# Patient Record
Sex: MALE | Race: WHITE | NOT HISPANIC OR LATINO | Employment: FULL TIME | URBAN - METROPOLITAN AREA
[De-identification: names, ages, dates, MRNs, and addresses within clinical notes are randomized per-mention and may not be internally consistent; named-entity substitution may affect disease eponyms.]

---

## 2023-08-04 ENCOUNTER — HOSPITAL ENCOUNTER (OUTPATIENT)
Dept: DATA CONVERSION | Facility: HOSPITAL | Age: 53
Discharge: HOME | End: 2023-08-04

## 2023-08-04 DIAGNOSIS — S46.212A STRAIN OF MUSCLE, FASCIA AND TENDON OF OTHER PARTS OF BICEPS, LEFT ARM, INITIAL ENCOUNTER: ICD-10-CM

## 2023-08-04 LAB
ALBUMIN SERPL-MCNC: 4.8 GM/DL (ref 3.5–5)
ALBUMIN/GLOB SERPL: 2.1 RATIO (ref 1.5–3)
ALP BLD-CCNC: 31 U/L (ref 35–125)
ALT SERPL-CCNC: 38 U/L (ref 5–40)
ANION GAP SERPL CALCULATED.3IONS-SCNC: 9 MMOL/L (ref 0–19)
ANTICOAGULANT: NORMAL
APTT PPP: 25 SEC (ref 22–32.5)
AST SERPL-CCNC: 35 U/L (ref 5–40)
BASOPHILS # BLD AUTO: 0.03 K/UL (ref 0–0.22)
BASOPHILS NFR BLD AUTO: 0.7 % (ref 0–1)
BILIRUB SERPL-MCNC: 0.5 MG/DL (ref 0.1–1.2)
BUN SERPL-MCNC: 18 MG/DL (ref 8–25)
BUN/CREAT SERPL: 16.4 RATIO (ref 8–21)
CALCIUM SERPL-MCNC: 9.7 MG/DL (ref 8.5–10.4)
CHLORIDE SERPL-SCNC: 105 MMOL/L (ref 97–107)
CO2 SERPL-SCNC: 26 MMOL/L (ref 24–31)
CREAT SERPL-MCNC: 1.1 MG/DL (ref 0.4–1.6)
DEPRECATED RDW RBC AUTO: 40 FL (ref 37–54)
DIFFERENTIAL METHOD BLD: ABNORMAL
EOSINOPHIL # BLD AUTO: 0.16 K/UL (ref 0–0.45)
EOSINOPHIL NFR BLD: 3.8 % (ref 0–3)
ERYTHROCYTE [DISTWIDTH] IN BLOOD BY AUTOMATED COUNT: 12.4 % (ref 11.7–15)
GFR SERPL CREATININE-BSD FRML MDRD: 81 ML/MIN/1.73 M2
GLOBULIN SER-MCNC: 2.3 G/DL (ref 1.9–3.7)
GLUCOSE SERPL-MCNC: 99 MG/DL (ref 65–99)
HCT VFR BLD AUTO: 45.6 % (ref 41–50)
HGB BLD-MCNC: 15.1 GM/DL (ref 13.5–16.5)
IMM GRANULOCYTES # BLD AUTO: 0.01 K/UL (ref 0–0.1)
INR PPP: 1 (ref 0.86–1.16)
LYMPHOCYTES # BLD AUTO: 1.28 K/UL (ref 1.2–3.2)
LYMPHOCYTES NFR BLD MANUAL: 30.5 % (ref 20–40)
MCH RBC QN AUTO: 28.7 PG (ref 26–34)
MCHC RBC AUTO-ENTMCNC: 33.1 % (ref 31–37)
MCV RBC AUTO: 86.5 FL (ref 80–100)
MONOCYTES # BLD AUTO: 0.38 K/UL (ref 0–0.8)
MONOCYTES NFR BLD MANUAL: 9 % (ref 0–8)
NEUTROPHILS # BLD AUTO: 2.34 K/UL
NEUTROPHILS # BLD AUTO: 2.34 K/UL (ref 1.8–7.7)
NEUTROPHILS.IMMATURE NFR BLD: 0.2 % (ref 0–1)
NEUTS SEG NFR BLD: 55.8 % (ref 50–70)
PLATELET # BLD AUTO: 212 K/UL (ref 150–450)
PMV BLD AUTO: 10.2 CU (ref 7–12.6)
POTASSIUM SERPL-SCNC: 4.7 MMOL/L (ref 3.4–5.1)
PROT SERPL-MCNC: 7.1 G/DL (ref 5.9–7.9)
PROTHROMBIN TIME: 10.2 SEC (ref 9.3–12.7)
RBC # BLD AUTO: 5.27 M/UL (ref 4.5–5.5)
SODIUM SERPL-SCNC: 140 MMOL/L (ref 133–145)
WBC # BLD AUTO: 4.2 K/UL (ref 4.5–11)

## 2023-08-08 ENCOUNTER — HOSPITAL ENCOUNTER (OUTPATIENT)
Dept: DATA CONVERSION | Facility: HOSPITAL | Age: 53
Discharge: HOME | End: 2023-08-08

## 2023-08-08 DIAGNOSIS — E78.5 HYPERLIPIDEMIA, UNSPECIFIED: ICD-10-CM

## 2023-08-08 DIAGNOSIS — S46.211A STRAIN OF MUSCLE, FASCIA AND TENDON OF OTHER PARTS OF BICEPS, RIGHT ARM, INITIAL ENCOUNTER: ICD-10-CM

## 2023-08-08 DIAGNOSIS — Z88.0 ALLERGY STATUS TO PENICILLIN: ICD-10-CM

## 2023-08-08 DIAGNOSIS — S46.212A STRAIN OF MUSCLE, FASCIA AND TENDON OF OTHER PARTS OF BICEPS, LEFT ARM, INITIAL ENCOUNTER: ICD-10-CM

## 2023-08-08 DIAGNOSIS — E66.9 OBESITY, UNSPECIFIED: ICD-10-CM

## 2023-08-08 DIAGNOSIS — I10 ESSENTIAL (PRIMARY) HYPERTENSION: ICD-10-CM

## 2023-08-08 DIAGNOSIS — Z88.1 ALLERGY STATUS TO OTHER ANTIBIOTIC AGENTS: ICD-10-CM

## 2023-10-04 PROBLEM — M25.622 DECREASED ROM OF LEFT ELBOW: Status: ACTIVE | Noted: 2023-10-04

## 2023-10-04 PROBLEM — S46.211D RUPTURE OF DISTAL BICEPS TENDON, RIGHT, SUBSEQUENT ENCOUNTER: Status: ACTIVE | Noted: 2023-10-04

## 2023-10-04 PROBLEM — S46.212A RUPTURE OF DISTAL BICEPS TENDON, LEFT, INITIAL ENCOUNTER: Status: ACTIVE | Noted: 2023-10-04

## 2023-10-04 PROBLEM — S46.219A BICEPS STRAIN: Status: ACTIVE | Noted: 2023-10-04

## 2023-10-06 ENCOUNTER — TREATMENT (OUTPATIENT)
Dept: PHYSICAL THERAPY | Facility: CLINIC | Age: 53
End: 2023-10-06
Payer: COMMERCIAL

## 2023-10-06 DIAGNOSIS — M25.622 STIFFNESS OF LEFT ELBOW, NOT ELSEWHERE CLASSIFIED: ICD-10-CM

## 2023-10-06 DIAGNOSIS — M25.622 DECREASED ROM OF LEFT ELBOW: Primary | ICD-10-CM

## 2023-10-06 PROCEDURE — 97110 THERAPEUTIC EXERCISES: CPT | Mod: GP,CQ

## 2023-10-06 ASSESSMENT — PAIN SCALES - GENERAL: PAINLEVEL_OUTOF10: 0 - NO PAIN

## 2023-10-06 ASSESSMENT — PAIN - FUNCTIONAL ASSESSMENT: PAIN_FUNCTIONAL_ASSESSMENT: 0-10

## 2023-10-06 NOTE — PROGRESS NOTES
Physical Therapy    Physical Therapy Treatment      Patient Name: Donnie Trotter  MRN: [id number]  Today's Date: 10/6/2023         Assessment:   Reviewed protocol with the pt. Encouraged him to not overdo to prevent re injuring the bicep. Tolerated progressed exercises well. No increased pain. Shoulder AROM flexion, scaption and abduction were challenging. Reviewed gentle elbow PROM for home.    Plan:   Assess tolerance of progressed exercises.    Current Problem:   1. Decreased ROM of left elbow        2. Stiffness of left elbow, not elsewhere classified  PT eval and treat          Subjective    General  General Comment: Doing well. No pain. Pt. has been doing normal activity but not lifting anything heavy. No longer wearing brace.    Precautions:   Post op distal bicep tendon repair   protocol     Pain:  Pain Assessment  Pain Assessment: 0-10  Pain Score: 0 - No pain  Pain Type: Acute pain  Pain Location: Elbow  Pain Orientation: Left     Objective    L elbow AROM (degrees)  Flexion = 134  Extension = -5      Treatments:   EXERCISE Date 10/6/23 Date Date Date    REPS REPS REPS REPS          UE Bike              Pulleys      Flexion       Pulleys      Scaption/Abduction       Pulleys      IR              Tband       Pull down Black 30X      Tband       Mid rows Black 30X      Tband       IR Purple 2X15      Tband       ER Purple 2X15      Tband       ADD Purple 30X             Wand              [facility]              UE RANGER              Bicep curl (3) 2# 2X15      Shoulder AROM flexion 2X15  3#                                 scaption 2X15  3#                                 abduction 2X15  3#                    Manual PROM  5 minutes                                   Goals:  by week >12   Range Of Motion/Joint Mobility: increase L shoulder/elbow AROM to WNL to ease capability to perform ADLs, goal partially met   Strength: increase L UE strength to 5/5 to prepare for eventual RTW, goal partially met

## 2023-10-11 ENCOUNTER — TREATMENT (OUTPATIENT)
Dept: PHYSICAL THERAPY | Facility: CLINIC | Age: 53
End: 2023-10-11
Payer: COMMERCIAL

## 2023-10-11 DIAGNOSIS — M25.622 STIFFNESS OF LEFT ELBOW, NOT ELSEWHERE CLASSIFIED: ICD-10-CM

## 2023-10-11 PROCEDURE — 97110 THERAPEUTIC EXERCISES: CPT | Mod: GP,CQ

## 2023-10-11 ASSESSMENT — PAIN SCALES - GENERAL: PAINLEVEL_OUTOF10: 0 - NO PAIN

## 2023-10-11 ASSESSMENT — PAIN - FUNCTIONAL ASSESSMENT: PAIN_FUNCTIONAL_ASSESSMENT: 0-10

## 2023-10-11 NOTE — PROGRESS NOTES
"Physical Therapy    Physical Therapy Treatment    Patient Name: Donnie Trotter  MRN: [id number]  Today's Date: 10/11/2023         Assessment:   Min verbal/tactile cueing needed to perform shoulder T-band IR/ER with the correct technique and form. Left UE fatigue at end of treatment but no increased soreness.    Plan:  Progress exercises per protocol.   Issue progressed HEP.    Current Problem  1. Stiffness of left elbow, not elsewhere classified  PT eval and treat          Subjective   General Comment: Left shoulder was a little sore after his last PT visit. No elbow soreness. Supination is \"tight\".    Precautions   Post op distal bicep tendon repair   protocol     Pain  Pain Assessment: 0-10  Pain Score: 0 - No pain  Pain Location: Elbow  Pain Orientation: Left    Objective    L elbow AROM (degrees)  Flexion = 134  Extension = -5    Treatments:  EXERCISE Date 10/6/23 Date 10/11/23 Date Date    REPS REPS REPS REPS          UE Bike              Pulleys      Flexion 3 minutes 3 minutes     Pulleys      Scaption/Abduction       Pulleys      IR              Tband       Pull down Black 30X Black 30x     Tband       Mid rows Black 30X Black 30x     Tband       IR Purple 2X15 Purple 2x15     Tband       ER Purple 2X15 Purple 2x15     Tband       ADD Purple 30X Purple 2x15            Wand              I T Y (paul)              UE RANGER              Bicep curl (3) 2# 2X15 2# 2x15     Shoulder AROM flexion 2X15  3# 2x15                                scaption 2X15  3# 2x15                                abduction 2X15  3# 2x15                   Manual PROM  5 minutes review                               Goals:   by week >12   Range Of Motion/Joint Mobility: increase L shoulder/elbow AROM to WNL to ease capability to perform ADLs, goal partially met   Strength: increase L UE strength to 5/5 to prepare for eventual RTW, goal partially met     "

## 2023-10-16 ENCOUNTER — TREATMENT (OUTPATIENT)
Dept: PHYSICAL THERAPY | Facility: CLINIC | Age: 53
End: 2023-10-16
Payer: COMMERCIAL

## 2023-10-16 DIAGNOSIS — M25.622 STIFFNESS OF LEFT ELBOW, NOT ELSEWHERE CLASSIFIED: ICD-10-CM

## 2023-10-16 PROCEDURE — 97110 THERAPEUTIC EXERCISES: CPT | Mod: GP,CQ

## 2023-10-16 ASSESSMENT — PAIN SCALES - GENERAL: PAINLEVEL_OUTOF10: 0 - NO PAIN

## 2023-10-16 ASSESSMENT — PAIN - FUNCTIONAL ASSESSMENT: PAIN_FUNCTIONAL_ASSESSMENT: 0-10

## 2023-10-16 NOTE — PROGRESS NOTES
"Physical Therapy    Physical Therapy Treatment    Patient Name: Donnie Trotter  MRN: [id number]  Today's Date: 10/16/2023  Time Calculation  Start Time: 0845  Stop Time: 0930  Time Calculation (min): 45 min      Assessment:   Patient performed added exercises without complaint of increased left elbow or upper extremity symptoms. Patient indicates and demonstrates compliance with home exercise program.     Plan:   Continue with working to improve range of motion and strength of left elbow and upper extremity progressing as per protocol to restore functional use of left upper extremity with daily activities.     Patient RTD 11/06/2023.  Issue progressed HEP.    Current Problem  1. Stiffness of left elbow, not elsewhere classified  PT eval and treat          Subjective    Patient reports mild soreness after last treatment however felt better by the following day.     Precautions  Precautions  Precautions Comment: Per protocolPost op distal bicep tendon repair   protocol     Pain  Pain Assessment: 0-10  Pain Score: 0 - No pain  Pain Location: Elbow  Pain Orientation: Left    Objective   Added exercises. See exercise log for specifics.   Progressed and issued HEP.   Left elbow AROM  Flexion = 135 degrees      Treatments:  EXERCISE Date 10/6/23 Date 10/11/23 Date   10/16/2023 Date    REPS REPS REPS REPS          UE Bike              Pulleys      Flexion 3 minutes 3 minutes 3 minutes    Pulleys      Scaption/Abduction       Pulleys      IR              Tband       Pull down Black 30X Black 30x Silver x 20    Tband       Mid rows Black 30X Black 30x Silver x 20    Tband       IR Purple 2X15 Purple 2x15 Silver x 20    Tband       ER Purple 2X15 Purple 2x15 Silver x 20    Tband       ADD Purple 30X Purple 2x15     BTE disc open/close jar   T-15 60\" each    BTE gripper   T-111 90\"    BTE screwdriver   T -9 60\" each    BTE pro/sup   T-6 60\" each    BTE ROM   T-75 100\" each    BTE H. Push/Pull   T-132 100\"    BTE Push/Pull   " "T-132 100\"    Bicep curl (3) 2# 2X15 2# 2x15     Shoulder AROM flexion 2X15  3# 2x15                                scaption 2X15  3# 2x15                                abduction 2X15  3# 2x15                   Manual PROM  5 minutes review                              Education  Education Comment: Access Code: [email or web address]  URL: [email or web address]  Date: 10/16/2023  Prepared by: Elías Gruber    Exercises  - Pulldowns  - 1-2 x daily - 7 x weekly - 1-3 sets - 20-30 reps  - Standing Shoulder Row with Anchored Resistance  - 1-2 x daily - 7 x weekly - 1-3 sets - 20-30 reps  - Shoulder External Rotation  - 1-2 x daily - 7 x weekly - 1-3 sets - 20-30 reps  - Shoulder Internal Rotation  - 1-2 x daily - 7 x weekly - 1-3 sets - 20-30 reps  - Forearm Pronation and Supination with Hammer  - 1-2 x daily - 7 x weekly - 1-3 sets - 20-30 reps  - Standing Wrist Radial Deviation with Hammer  - 1-2 x daily - 7 x weekly - 1-3 sets - 20-30 reps  - Standing Wrist Ulnar Deviation with Hammer  - 1-2 x daily - 7 x weekly - 1-3 sets - 20-30 reps    Goals:   by week >12   Range Of Motion/Joint Mobility: increase L shoulder/elbow AROM to WNL to ease capability to perform ADLs,     10/16/2023 progressing    Strength: increase L UE strength to 5/5 to prepare for eventual RTW, goal partially met   "

## 2023-10-23 ENCOUNTER — TREATMENT (OUTPATIENT)
Dept: PHYSICAL THERAPY | Facility: CLINIC | Age: 53
End: 2023-10-23
Payer: COMMERCIAL

## 2023-10-23 DIAGNOSIS — M25.622 STIFFNESS OF LEFT ELBOW, NOT ELSEWHERE CLASSIFIED: ICD-10-CM

## 2023-10-23 PROCEDURE — 97110 THERAPEUTIC EXERCISES: CPT | Mod: GP

## 2023-10-23 ASSESSMENT — PAIN SCALES - GENERAL: PAINLEVEL_OUTOF10: 0 - NO PAIN

## 2023-10-23 ASSESSMENT — PAIN - FUNCTIONAL ASSESSMENT: PAIN_FUNCTIONAL_ASSESSMENT: 0-10

## 2023-10-23 NOTE — PROGRESS NOTES
"Physical Therapy    Physical Therapy Treatment    Patient Name: Donnie Trotter  MRN: [id number]  Today's Date: 10/23/2023  Time Calculation  Start Time: 0815  Stop Time: 0900  Time Calculation (min): 45 min    Assessment:   The patient reports experiencing L UE fatigue following Rx but no pain.      Plan:   Continue with working to improve range of motion and strength of left elbow and upper extremity progressing as per protocol to restore functional use of left upper extremity with daily activities.     Patient RTD 11/06/2023.  Issue progressed HEP.    Current Problem  1. Stiffness of left elbow, not elsewhere classified  PT eval and treat          Subjective    The patient reports he did not experience L UE pain or soreness following last treatment session.      Precautions  Precautions  Precautions Comment: per protocolPost op distal bicep tendon repair   protocol     Pain  Pain Assessment: 0-10  Pain Score: 0 - No pain    Objective   L elbow AROM = WNL      Treatments:  EXERCISE Date 10/6/23 Date 10/11/23 Date   10/16/2023 Date 10/23/23    REPS REPS REPS REPS          UE Bike              Pulleys      Flexion 3 minutes 3 minutes 3 minutes 3'   Pulleys      Scaption/Abduction       Pulleys      IR       T Band Skis    Black x20   Tband       Pull down Black 30X Black 30x Silver x 20 \"   Tband       Mid rows Black 30X Black 30x Silver x 20 \"   Tband       IR Purple 2X15 Purple 2x15 Silver x 20 \"   Tband       ER Purple 2X15 Purple 2x15 Silver x 20 \"   Tband       ADD Purple 30X Purple 2x15     BTE disc open/close jar   T-15 60\" each T - 15 60\" ea   BTE gripper   T-111 90\" T - 111 90\"   BTE screwdriver   T -9 60\" each T-9 60\" ea   BTE pro/sup   T-6 60\" each T-6 60\" ea   BTE ROM   T-75 100\" each T-75 100\" ea   BTE H. Push/Pull   T-132 100\" T- 132 100\"   BTE Push/Pull   T-132 100\" T-132 100\"   Bicep curl (3) 2# 2X15 2# 2x15  3# 2x15   Shoulder AROM flexion 2X15  3# 2x15  3# 2x15                              scaption " 2X15  3# 2x15  3# 2x15                              abduction 2X15  3# 2x15  3# 2x15                 Manual PROM  5 minutes review                                   Goals:   by week >12   Range Of Motion/Joint Mobility: increase L shoulder/elbow AROM to WNL to ease capability to perform ADLs,    - progressing    Strength: increase L UE strength to 5/5 to prepare for eventual RTW, goal partially met

## 2023-10-30 ENCOUNTER — APPOINTMENT (OUTPATIENT)
Dept: PHYSICAL THERAPY | Facility: CLINIC | Age: 53
End: 2023-10-30
Payer: COMMERCIAL

## 2023-11-06 ENCOUNTER — OFFICE VISIT (OUTPATIENT)
Dept: ORTHOPEDIC SURGERY | Facility: CLINIC | Age: 53
End: 2023-11-06
Payer: COMMERCIAL

## 2023-11-06 VITALS — HEIGHT: 71 IN | WEIGHT: 240 LBS | BODY MASS INDEX: 33.6 KG/M2

## 2023-11-06 DIAGNOSIS — S46.212A RUPTURE OF DISTAL BICEPS TENDON, LEFT, INITIAL ENCOUNTER: Primary | ICD-10-CM

## 2023-11-06 PROCEDURE — 99024 POSTOP FOLLOW-UP VISIT: CPT | Performed by: STUDENT IN AN ORGANIZED HEALTH CARE EDUCATION/TRAINING PROGRAM

## 2023-11-06 RX ORDER — ROSUVASTATIN CALCIUM 40 MG/1
40 TABLET, COATED ORAL EVERY 24 HOURS
COMMUNITY
Start: 2022-08-16

## 2023-11-06 RX ORDER — FENOFIBRATE 145 MG/1
145 TABLET ORAL EVERY 24 HOURS
COMMUNITY

## 2023-11-06 RX ORDER — LISINOPRIL 10 MG/1
10 TABLET ORAL EVERY 24 HOURS
COMMUNITY

## 2023-11-06 RX ORDER — OMEGA-3-ACID ETHYL ESTERS 1 G/1
1 CAPSULE, LIQUID FILLED ORAL EVERY 24 HOURS
COMMUNITY

## 2023-11-06 ASSESSMENT — PAIN - FUNCTIONAL ASSESSMENT: PAIN_FUNCTIONAL_ASSESSMENT: NO/DENIES PAIN

## 2023-11-06 NOTE — PROGRESS NOTES
13 weeks status post distal biceps reconstruction with allograft done 8/8/23. He is doing well and has no concerns at this time. Progressing well through PT. Patient mentions occasional numbness and tingling into his thumb.

## 2023-11-07 NOTE — PROGRESS NOTES
PRIMARY CARE PHYSICIAN: Jimenez Carlin MD    ORTHOPAEDIC POSTOPERATIVE VISIT    ASSESSMENT & PLAN    Impression: 53 y.o. male 3 months postop s/p left distal biceps reconstruction with allograft.    Plan:   Overall he is doing well.  He will progressively return to his activities as he tolerates.  He will continue to work on his strength.  He will return to see me as needed.  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  He will likely require 2 additional months of strengthening prior to being ready to return to work.    I reviewed the intraoperative findings with the patient and answered all their questions. I reviewed their postoperative timeline and plan with them. They understand the postoperative precautions and the treatment plan going forward.     Follow-Up: Patient will follow-up as needed    At the end of the visit, all questions were answered in full. The patient is in agreement with the plan and recommendations. They will call the office with any questions/concerns.      Note dictated with [employer] software. Completed without full typed error editing and sent to avoid delay.      SUBJECTIVE  CHIEF COMPLAINT:   Chief Complaint   Patient presents with    Left Elbow - Follow-up        HPI: Donnie Trotter is a 53 y.o. patient. Donnie Trotter is now postop status post left distal biceps reconstruction with allograft.  Overall he is doing well.  He notes minimal pain.  His strength is improving.    REVIEW OF SYSTEMS  Constitutional: See HPI for pain assessment, No significant weight loss, recent trauma  Cardiovascular: No chest pain, shortness of breath  Respiratory: No difficulty breathing, cough  Gastrointestinal: No nausea, vomiting, diarrhea, constipation  Musculoskeletal: Noted in HPI, positive for pain, restricted motion, stiffness  Integumentary: No rashes,  "easy bruising, redness   Neurological: no numbness or tingling in extremities, no gait disturbances   Psychiatric: No mood changes, memory changes, social issues  Heme/Lymph: no excessive swelling, easy bruising, excessive bleeding  ENT: No hearing changes  Eyes: No vision changes    CURRENT MEDICATIONS:   Current Outpatient Medications   Medication Sig Dispense Refill    lisinopril 10 mg tablet Take 1 tablet (10 mg) by mouth once every 24 hours.      Lovaza 1 gram capsule Take 1 capsule (1 g) by mouth once every 24 hours.      rosuvastatin (Crestor) 40 mg tablet Take 1 tablet (40 mg) by mouth once every 24 hours.      Tricor 145 mg tablet Take 1 tablet (145 mg) by mouth once every 24 hours.       No current facility-administered medications for this visit.        OBJECTIVE    PHYSICAL EXAM      11/6/2023     8:05 AM   Vitals   Height (in) 1.803 m (5' 11\")   Weight (lb) 240   BMI 33.47 kg/m2   BSA (m2) 2.34 m2   Visit Report Report      Body mass index is 33.47 kg/m².    General: Well-appearing, no acute distress    Skin intact bilateral upper and lower extremities  No erythema  No warmth    Detailed examination of the left elbow demonstrates:  Surgical incision(s) well-healed  No erythema or warmth  No ecchymosis or soft tissue swelling  Biceps contour normal  Elbow range of motion: 0-0-135  Upper extremity motor grossly intact  C5-T1 sensation intact bilaterally  2+ radial pulses bilaterally  Warm and well-perfused, brisk capillary refill    IMAGING:   No new imaging      Santino Roberto MD  Attending Surgeon    Sports Medicine Orthopaedic Surgery  [facility] [place]  [facility] [employer]    "

## 2023-11-09 ENCOUNTER — TREATMENT (OUTPATIENT)
Dept: PHYSICAL THERAPY | Facility: CLINIC | Age: 53
End: 2023-11-09
Payer: COMMERCIAL

## 2023-11-09 DIAGNOSIS — M25.622 STIFFNESS OF LEFT ELBOW, NOT ELSEWHERE CLASSIFIED: Primary | ICD-10-CM

## 2023-11-09 PROCEDURE — 97110 THERAPEUTIC EXERCISES: CPT | Mod: GP

## 2023-11-09 ASSESSMENT — PAIN - FUNCTIONAL ASSESSMENT: PAIN_FUNCTIONAL_ASSESSMENT: 0-10

## 2023-11-09 ASSESSMENT — PAIN SCALES - GENERAL: PAINLEVEL_OUTOF10: 0 - NO PAIN

## 2023-11-09 NOTE — PROGRESS NOTES
"Physical Therapy    Physical Therapy Treatment (reassessment)    Patient Name: Donnie Trotter  MRN: [id number]  Today's Date: 11/9/2023  Time Calculation  Start Time: 1215  Stop Time: 1255  Time Calculation (min): 40 min    Assessment:   The patient's L UE strength continues to improve.      Plan:   Continue with working to improve strength of left elbow and upper extremity preparing for eventual RTW.      Patient RTD 11/06/2023.  Issue progressed HEP.    Current Problem  1. Stiffness of left elbow, not elsewhere classified  PT eval and treat    Follow Up In Physical Therapy          Subjective    The patient reports Dr. Roberto cleared him to return to the gym using light weights.      Precautions  Precautions  Precautions Comment: per protocol   Post op distal bicep tendon repair protocol     Pain  Pain Assessment: 0-10  Pain Score: 0 - No pain    Objective   L elbow AROM = WNL    L UE strength  Biceps = 5/5 (pain)  Triceps = 5/5  Supination = 5/5 (pain)  Pronation = 5/5    Quickdash - 31.82  Treatments:  EXERCISE Date 10/6/23 Date 10/11/23 Date   10/16/2023 Date 10/23/23    REPS REPS REPS REPS          UE Bike              Pulleys      Flexion 3 minutes 3 minutes 3 minutes 3'   Pulleys      Scaption/Abduction       Pulleys      IR       T Band Skis    Black x20   Tband       Pull down Black 30X Black 30x Silver x 20 \"   Tband       Mid rows Black 30X Black 30x Silver x 20 \"   Tband       IR Purple 2X15 Purple 2x15 Silver x 20 \"   Tband       ER Purple 2X15 Purple 2x15 Silver x 20 \"   Tband       ADD Purple 30X Purple 2x15     BTE disc open/close jar   T-15 60\" each T - 15 60\" ea   BTE gripper   T-111 90\" T - 111 90\"   BTE screwdriver   T -9 60\" each T-9 60\" ea   BTE pro/sup   T-6 60\" each T-6 60\" ea   BTE ROM   T-75 100\" each T-75 100\" ea   BTE H. Push/Pull   T-132 100\" T- 132 100\"   BTE Push/Pull   T-132 100\" T-132 100\"   Bicep curl (3) 2# 2X15 2# 2x15  3# 2x15   Shoulder AROM flexion 2X15  3# 2x15  3# 2x15        " "                      scaption 2X15  3# 2x15  3# 2x15                              abduction 2X15  3# 2x15  3# 2x15                 Manual PROM  5 minutes review                            EXERCISE Date 11/9/23 Date Date Date    REPS REPS REPS REPS          UE Bike              Pulleys      Flexion       Pulleys      Scaption/Abduction       Pulleys      IR              Tband       Pull down       Tband       Mid rows       Tband       IR       Tband       ER       Flex/Scap/Abd 5# 2x15             Wand              I T Y ([facility])              UE RANGER              PRIMUS ROM  T-91 100\"ea      V P/P T-168 100\"      H P/P T-168 100\"      Pro/Sup T-6 60\" ea      Wrist Flex/Ext T-9 60\" ea      Gripper T-111 90\"       Open/close jar T-15 60\"ea                    Reassess 10'            Goals:   by week >12   Range Of Motion/Joint Mobility: increase L shoulder/elbow AROM to WNL to ease capability to perform ADLs,    - goal met    Strength: The patient will demonstrate pain free 5/5 L UE strength to prepare for eventual RTW, goal partially met   "

## 2023-11-10 ENCOUNTER — DOCUMENTATION (OUTPATIENT)
Dept: ORTHOPEDIC SURGERY | Facility: CLINIC | Age: 53
End: 2023-11-10
Payer: COMMERCIAL

## 2023-11-10 NOTE — LETTER
November 10, 2023     Patient: Donnie Trotter   YOB: 1970   Date of Visit: 11/10/2023       To Whom It May Concern:    Donnie Trotter was seen in my clinic on 11/10/2023, it's my medical advice that at this point he is not yet ready to return to work as he requires additional strengthening in order to return to his strenuous job which requires heavy lifting.  He will likely require 2 additional months of strengthening prior to being ready to return to work.     If you have any questions or concerns, please don't hesitate to call [phone].         Sincerely,         Dr. Santino Roberto M.D.

## 2023-11-16 ENCOUNTER — TREATMENT (OUTPATIENT)
Dept: PHYSICAL THERAPY | Facility: CLINIC | Age: 53
End: 2023-11-16
Payer: COMMERCIAL

## 2023-11-16 DIAGNOSIS — M25.622 STIFFNESS OF LEFT ELBOW, NOT ELSEWHERE CLASSIFIED: ICD-10-CM

## 2023-11-16 PROCEDURE — 97110 THERAPEUTIC EXERCISES: CPT | Mod: GP,CQ

## 2023-11-16 ASSESSMENT — PAIN - FUNCTIONAL ASSESSMENT: PAIN_FUNCTIONAL_ASSESSMENT: 0-10

## 2023-11-16 ASSESSMENT — PAIN SCALES - GENERAL: PAINLEVEL_OUTOF10: 0 - NO PAIN

## 2023-11-16 NOTE — PROGRESS NOTES
"Physical Therapy    Physical Therapy Treatment    Patient Name: Donnie Trotter  MRN: [id number]  Today's Date: 11/16/2023   Time Calculation  Start Time: 800  Stop Time: 845  Time Calculation (min): 40 min      Assessment:   Left UE strength is improving nicely. No increased pain at the end of his treatment but he was fatigued.    Plan:    Continue with working to improve strength of left elbow and upper extremity preparing for eventual RTW.        Current Problem  1. Stiffness of left elbow, not elsewhere classified  Follow Up In Physical Therapy        Subjective  General   General  General Comment: Pt. was released to go back to the gym. He did a chest and triceps workout yesterday. He is sore today. Pt. is doing light weight and high reps. He will follow up with Dr. Roberto on 1/8/23. Pt. will discuss possible return to work.    Precautions  Precautions  Precautions Comment: per protocol   Post op distal bicep tendon repair protocol     Pain  Pain Assessment  Pain Assessment: 0-10  Pain Score: 0 - No pain Continue with working to improve strength of left elbow and upper extremity preparing for eventual RTW.      Objective   L UE strength  Biceps = 5/5 (pain)  Triceps = 5/5  Supination = 5/5 (pain)  Pronation = 5/5    Increased BTE exercise resistance    Treatments:  EXERCISE Date 11/9/23 Date 11/16/23 Date Date    REPS REPS REPS REPS          UE Bike              Pulleys      Flexion  3 minutes     Pulleys      Scaption/Abduction       Pulleys      IR              Tband       Pull down       Tband       Mid rows  Black 30x     Tband       IR       Tband       ER       Flex/Scap/Abd 5# 2x15 5# 2x15            Wand              I T Y ([facility])              UE RANGER              PRIMUS ROM  T-91 100\"ea T101 100\" each     V P/P T-168 100\" T174 100\" each     H P/P T-168 100\" T174 100\" each     Pro/Sup T-6 60\" ea T 12 60\" each     Wrist Flex/Ext T-9 60\" ea NT     Gripper T-111 90\"  T120 90\"     Open/close jar T-15 " "60\"ea T 20 60\" ea                   Reassess 10'         Goals:  by week >12   Range Of Motion/Joint Mobility: increase L shoulder/elbow AROM to WNL to ease capability to perform ADLs,    - goal met    Strength: The patient will demonstrate pain free 5/5 L UE strength to prepare for eventual RTW, goal partially met   "

## 2023-11-21 ENCOUNTER — TREATMENT (OUTPATIENT)
Dept: PHYSICAL THERAPY | Facility: CLINIC | Age: 53
End: 2023-11-21
Payer: COMMERCIAL

## 2023-11-21 DIAGNOSIS — M25.622 STIFFNESS OF LEFT ELBOW, NOT ELSEWHERE CLASSIFIED: ICD-10-CM

## 2023-11-21 PROCEDURE — 97110 THERAPEUTIC EXERCISES: CPT | Mod: GP,CQ

## 2023-11-21 ASSESSMENT — PAIN SCALES - GENERAL: PAINLEVEL_OUTOF10: 0 - NO PAIN

## 2023-11-21 ASSESSMENT — PAIN - FUNCTIONAL ASSESSMENT: PAIN_FUNCTIONAL_ASSESSMENT: 0-10

## 2023-11-21 NOTE — PROGRESS NOTES
"Physical Therapy    Physical Therapy Treatment    Patient Name: Donnie Trotter  MRN: [id number]  Today's Date: 11/21/2023   Time Calculation  Start Time: 800  Stop Time: 845  Time Calculation (min): 40 min    Visit 8    Assessment:   Left UE strength is improving nicely. No increased pain at the end of his treatment but he was fatigued. Temporary discomfort in forearm when doing BTE exercises.    Plan:    Continue with working to improve strength of left elbow and upper extremity preparing for eventual RTW.        Current Problem  1. Stiffness of left elbow, not elsewhere classified  Follow Up In Physical Therapy        Subjective  General    Returned to the gym last week using 1/4 pre-injury weight. He is starting with 3 gym sessions a week. He did not have an increase in pain after the gym sessions, just soreness. Biceps exercises at gym still feel \"weird\" - as if biceps is cramping. Bicep \"balled up\" during gym workout. Once he straightened out his arm the tightness decreased.     Precautions  Precautions  Precautions Comment: per protocol   Post op distal bicep tendon repair protocol     Pain  Pain Assessment  Pain Assessment: 0-10  Pain Score: 0 - No pain  Pain Location: Shoulder  Pain Orientation: Left Continue with working to improve strength of left elbow and upper extremity preparing for eventual RTW.      Objective   L UE strength  Biceps = 5/5 (pain)  Triceps = 5/5  Supination = 5/5 (pain)  Pronation = 5/5    Added Tband Pull down  Increased resistance for BTE ROM, Gripper, Open/Close Jar, and resumed Wrist Flexion/Extension.    Treatments:  EXERCISE Date 11/9/23 Date 11/16/23 Date 11/21/23 Date    REPS REPS REPS REPS          UE Bike              Pulleys      Flexion  3 minutes 3 minutes    Pulleys      Scaption/Abduction       Pulleys      IR              Tband       Pull down   Black 30x    Tband       Mid rows  Black 30x Black 30x    Tband       IR       Tband       ER       Flex/Scap/Abd 5# 2x15 5# " "2x15 5# 2x15           Wand              I T Y ([facility])              UE RANGER              PRIMUS ROM  T-91 100\"ea T101 100\" each T-111 100\" each    V P/P T-168 100\" T174 100\" each T-174 100\"    H P/P T-168 100\" T174 100\" each T-174 100\"    Pro/Sup T-6 60\" ea T 12 60\" each T-15 60\" each    Wrist Flex/Ext T-9 60\" ea NT T-9 60\" each    Gripper T-111 90\"  T120 90\" T-129 90\"    Open/close jar T-15 60\"ea T 20 60\" ea T-24 60\" each                  Reassess 10'         Goals:  by week >12   Range Of Motion/Joint Mobility: increase L shoulder/elbow AROM to WNL to ease capability to perform ADLs,    - goal met    Strength: The patient will demonstrate pain free 5/5 L UE strength to prepare for eventual RTW, goal partially met   "

## 2023-11-28 ENCOUNTER — TREATMENT (OUTPATIENT)
Dept: PHYSICAL THERAPY | Facility: CLINIC | Age: 53
End: 2023-11-28
Payer: COMMERCIAL

## 2023-11-28 DIAGNOSIS — M25.622 STIFFNESS OF LEFT ELBOW, NOT ELSEWHERE CLASSIFIED: ICD-10-CM

## 2023-11-28 PROCEDURE — 97110 THERAPEUTIC EXERCISES: CPT | Mod: GP,CQ

## 2023-11-28 ASSESSMENT — PAIN SCALES - GENERAL: PAINLEVEL_OUTOF10: 0 - NO PAIN

## 2023-11-28 ASSESSMENT — PAIN - FUNCTIONAL ASSESSMENT: PAIN_FUNCTIONAL_ASSESSMENT: 0-10

## 2023-11-28 NOTE — PROGRESS NOTES
"Physical Therapy    Physical Therapy Treatment    Patient Name: Donnie Trotter  MRN: [id number]  Today's Date: 11/28/2023   Time Calculation  Start Time: 845  Stop Time: 930  Time Calculation (min): 45 min    Visit 9    Assessment:   Pt. was able to complete exercises without c/o increased pain or soreness. Pt. was given upper trap and levator  stretches to perform at home. He was fatigued after doing the body blade exercises.    Plan:    Continue with working to improve strength of left elbow and upper extremity preparing for eventual RTW.      Current Problem  1. Stiffness of left elbow, not elsewhere classified  Follow Up In Physical Therapy        Subjective  General   Pt. Reports a \"tingling\" in his biceps prior to treatment. Pt. is working out each upper body muscle group at the gym one time per week.    Precautions  Precautions  Precautions Comment: per protocol   Post op distal bicep tendon repair protocol     Pain  Pain Assessment  Pain Assessment: 0-10  Pain Score: 0 - No pain  Pain Location: Shoulder  Pain Orientation: Left       Objective   L UE strength  Biceps = 5/5 (pain)  Triceps = 5/5  Supination = 5/5 (pain)  Pronation = 5/5    Issued and reviewed technique with the Upper trap and Levator stretches    Treatments:  EXERCISE Date 11/9/23 Date 11/16/23 Date 11/21/23 Date 11/28/23    REPS REPS REPS REPS          UE Bike              Pulleys      Flexion  3 minutes 3 minutes 3 min   Pulleys      Scaption/Abduction       Pulleys      IR              Tband       Pull down   Black 30x Black 30x   Tband       Mid rows  Black 30x Black 30x Black 30x   Tband       IR       Tband       ER       Flex/Scap/Abd 5# 2x15 5# 2x15 5# 2x15 5# 2x15          Wand              I T Y ([facility])              UE RANGER              PRIMUS ROM  T-91 100\"ea T101 100\" each T-111 100\" each T- 117 100\" ea   V P/P T-168 100\" T174 100\" each T-174 100\" T-1180 100\"   H P/P T-168 100\" T174 100\" each T-174 100\" T- 180 100\"   Pro/Sup " "T-6 60\" ea T 12 60\" each T-15 60\" each T-21 60\" ea   Wrist Flex/Ext T-9 60\" ea NT T-9 60\" each T-15 60\" ea   Gripper T-111 90\"  T120 90\" T-129 90\" T-129 90\"   Open/close jar T-15 60\"ea T 20 60\" ea T-24 60\" each T-24 60\" ea                 Reassess 10'         Goals:  by week >12   Range Of Motion/Joint Mobility: increase L shoulder/elbow AROM to WNL to ease capability to perform ADLs,    - goal met    Strength: The patient will demonstrate pain free 5/5 L UE strength to prepare for eventual RTW, goal partially met   "

## 2023-12-05 ENCOUNTER — TREATMENT (OUTPATIENT)
Dept: PHYSICAL THERAPY | Facility: CLINIC | Age: 53
End: 2023-12-05
Payer: COMMERCIAL

## 2023-12-05 DIAGNOSIS — M25.622 DECREASED ROM OF LEFT ELBOW: Primary | ICD-10-CM

## 2023-12-05 DIAGNOSIS — M25.622 STIFFNESS OF LEFT ELBOW, NOT ELSEWHERE CLASSIFIED: ICD-10-CM

## 2023-12-05 PROCEDURE — 97110 THERAPEUTIC EXERCISES: CPT | Mod: GP

## 2023-12-05 ASSESSMENT — PAIN SCALES - GENERAL: PAINLEVEL_OUTOF10: 0 - NO PAIN

## 2023-12-05 ASSESSMENT — PAIN - FUNCTIONAL ASSESSMENT: PAIN_FUNCTIONAL_ASSESSMENT: 0-10

## 2023-12-05 NOTE — PROGRESS NOTES
"Physical Therapy    Physical Therapy Treatment (reassessment)    Patient Name: Donnie Trotter  MRN: [id number]  Today's Date: 12/5/2023   Time Calculation  Start Time: 815  Stop Time: 905  Time Calculation (min): 50 min    Visit 9 on Epic (14 Total)    Assessment:   The patient's L UE strength continues to improve.     Plan:    Continue with working to improve strength of left elbow and upper extremity preparing for eventual RTW.      Current Problem  1. Decreased ROM of left elbow  Follow Up In Physical Therapy      2. Stiffness of left elbow, not elsewhere classified  Follow Up In Physical Therapy    PT eval and treat        Subjective  General  PT  Visit  PT Received On: 12/05/23  The patient reports he continues to experience weakness when performing supination strengthening exercises.    Precautions  Precautions  Precautions Comment: per protocol   Post op distal bicep tendon repair protocol     Pain  Pain Assessment  Pain Assessment: 0-10  Pain Score: 0 - No pain       Objective   L UE strength  Biceps = 5/5   Triceps = 5/5  Supination = 4+/5   Pronation = 5/5    Quickdash 29.55    Treatments:  EXERCISE Date 11/9/23 Date 11/16/23 Date 11/21/23 Date 11/28/23    REPS REPS REPS REPS          UE Bike              Pulleys      Flexion  3 minutes 3 minutes 3 min   Pulleys      Scaption/Abduction       Pulleys      IR              Tband       Pull down   Black 30x Black 30x   Tband       Mid rows  Black 30x Black 30x Black 30x   Tband       IR       Tband       ER       Flex/Scap/Abd 5# 2x15 5# 2x15 5# 2x15 5# 2x15          Wand              I T Y ([facility])              UE RANGER              PRIMUS ROM  T-91 100\"ea T101 100\" each T-111 100\" each T- 117 100\" ea   V P/P T-168 100\" T174 100\" each T-174 100\" T-1180 100\"   H P/P T-168 100\" T174 100\" each T-174 100\" T- 180 100\"   Pro/Sup T-6 60\" ea T 12 60\" each T-15 60\" each T-21 60\" ea   Wrist Flex/Ext T-9 60\" ea NT T-9 60\" each T-15 60\" ea   Gripper T-111 90\"  T120 " "90\" T-129 90\" T-129 90\"   Open/close jar T-15 60\"ea T 20 60\" ea T-24 60\" each T-24 60\" ea                 Reassess 10'        EXERCISE Date 12/5/23 Date Date Date    REPS REPS REPS REPS          UE Bike              Pulleys      Flexion 3'      Pulleys      Scaption/Abduction       Pulleys      IR              Tband       Pull down       Tband       Mid rows       Tband       IR       Tband       ER                     PRIMUS ROM       V P/P T-180 100\"      H P/P T-180 100\"      Pro/Sup T-21 60\"ea      Wrist Flex/Ext T-15 60\"ea      Gripper T-129 100\"      Open/Close Jar T-24 60\" ea             Flex/Scap/Abd to 90 5# 2x15      SL ER 5# 3x10      Serratus Punches 10# 3x10             Body Blade V/H Flex/Scap/Abd 30\"ea                               Goals:  by week >12   1) Range Of Motion/Joint Mobility: increase L shoulder/elbow AROM to WNL to ease capability to perform ADLs,    - goal met    2) Strength: The patient will demonstrate pain free 5/5 L UE strength to prepare for eventual RTW, goal partially met      3) The patient will be able to perform all necessary work duties pain free -partially met    "

## 2023-12-13 ENCOUNTER — TREATMENT (OUTPATIENT)
Dept: PHYSICAL THERAPY | Facility: CLINIC | Age: 53
End: 2023-12-13
Payer: COMMERCIAL

## 2023-12-13 DIAGNOSIS — M25.622 DECREASED ROM OF LEFT ELBOW: ICD-10-CM

## 2023-12-13 PROCEDURE — 97110 THERAPEUTIC EXERCISES: CPT | Mod: GP,CQ

## 2023-12-13 ASSESSMENT — PAIN SCALES - GENERAL: PAINLEVEL_OUTOF10: 0 - NO PAIN

## 2023-12-13 ASSESSMENT — PAIN - FUNCTIONAL ASSESSMENT: PAIN_FUNCTIONAL_ASSESSMENT: 0-10

## 2023-12-13 NOTE — PROGRESS NOTES
"Physical Therapy    Physical Therapy Treatment (reassessment)    Patient Name: Donnie Trotter  MRN: [id number]  Today's Date: 12/13/2023   Time Calculation  Start Time: 815  Stop Time: 905  Time Calculation (min): 50 min    Visit 10 on Epic (14 Total)    Assessment:   The patient's L UE strength continues to improve. Pt. experienced muscle fatigue after treatment session. He  felt that today was more difficult than the last session. He was able to complete the exercise program without an increase in pain.    Plan:    Continue with working to improve strength of left elbow and upper extremity preparing for eventual RTW.      Assess left shoulder AROM next visit.    Current Problem  1. Decreased ROM of left elbow  Follow Up In Physical Therapy        Subjective  General   Pt. reports muscle fatigue following previous treatment session. He is beginning to make gains at the gym outside of treatment. Pt. States that he is slowly returning to prior level of function. However, he does not think that he is capable of performing his responsibilities at work yet. He will ask Dr. Roberto for another month of PT at his next follow up appointment on 1/8/23.    Precautions  Precautions  Precautions Comment: per protocol   Post op distal bicep tendon repair protocol     Pain  Pain Assessment  Pain Assessment: 0-10  Pain Score: 0 - No pain       Objective   L UE strength  Biceps = 5/5   Triceps = 5/5  Supination = 4+/5   Pronation = 5/5      Treatments:  EXERCISE Date 12/5/23 Date 12/13/23 Date Date    REPS REPS REPS REPS          UE Bike              Pulleys      Flexion 3' 3'     Pulleys      Scaption/Abduction       Pulleys      IR              Tband       Pull down       Tband       Mid rows       Tband       IR       Tband       ER                     PRIMUS ROM  T-150 60\" ea     V P/P T-180 100\" T-180 100\"     H P/P T-180 100\" T-180 100\"     Pro/Sup T-21 60\"ea T-21 60\" ea     Wrist Flex/Ext T-15 60\"ea T-15 60\" ea     Gripper " "T-129 100\" T-129 100\"     Open/Close Jar T-24 60\" ea T-24 60\" ea            Flex/Scap/Abd to 90 5# 2x15 5# 2x15     SL ER 5# 3x10 5# 3x10     Serratus Punches 10# 3x10 10# 3x10            Body Blade V/H Flex/Scap/Abd 30\"ea 30\" ea                              Goals:  by week >12   1) Range Of Motion/Joint Mobility: increase L shoulder/elbow AROM to WNL to ease capability to perform ADLs,    - goal met    2) Strength: The patient will demonstrate pain free 5/5 L UE strength to prepare for eventual RTW, goal partially met      3) The patient will be able to perform all necessary work duties pain free -partially met  "

## 2023-12-20 ENCOUNTER — TREATMENT (OUTPATIENT)
Dept: PHYSICAL THERAPY | Facility: CLINIC | Age: 53
End: 2023-12-20
Payer: COMMERCIAL

## 2023-12-20 DIAGNOSIS — M25.622 DECREASED ROM OF LEFT ELBOW: ICD-10-CM

## 2023-12-20 PROCEDURE — 97110 THERAPEUTIC EXERCISES: CPT | Mod: GP,CQ

## 2023-12-20 ASSESSMENT — PAIN - FUNCTIONAL ASSESSMENT: PAIN_FUNCTIONAL_ASSESSMENT: 0-10

## 2023-12-20 ASSESSMENT — PAIN SCALES - GENERAL: PAINLEVEL_OUTOF10: 0 - NO PAIN

## 2023-12-20 NOTE — PROGRESS NOTES
"Physical Therapy    Physical Therapy Treatment   Patient Name: Donnie Trotter  MRN: [id number]  Today's Date: 12/20/2023   Time Calculation  Start Time: 815  Stop Time: 905  Time Calculation (min): 50 min    Visit 11 on Epic (14 Total)    Assessment:   The patient's L UE strength continues to improve. Pt. experienced muscle fatigue after treatment session.  He was able to complete the exercise program without an increase in pain. Unable to increase resistance with strengthening exercises today secondary to fatigue.    Plan:    Continue with working to improve strength of left elbow and upper extremity preparing for eventual RTW.      Assess left shoulder AROM next visit.    Current Problem  1. Decreased ROM of left elbow  Follow Up In Physical Therapy        Subjective  General   Pt. hopes to not return to work and continue with PT for two more months. He does not feel he will be able to do his required work tasks before that timeframe. Pt. continues to do upper body workouts at the gym. The bicep feels like it is \"going to ball up at times\".    Precautions  Precautions  Precautions Comment: per protocol   Post op distal bicep tendon repair protocol     Pain  Pain Assessment  Pain Assessment: 0-10  Pain Score: 0 - No pain  Pain Location: Shoulder  Pain Orientation: Right       Objective   L UE strength  Biceps = 5/5   Triceps = 5/5  Supination = 4+/5   Pronation = 5/5    Treatments:  EXERCISE Date 12/5/23 Date 12/13/23 Date 12/20/23 Date    REPS REPS REPS REPS          UE Bike              Pulleys      Flexion 3' 3' 3 minutes    Pulleys      Scaption/Abduction       Pulleys      IR              Tband       Pull down       Tband       Mid rows       Tband       IR       Tband       ER                     PRIMUS ROM  T-150 60\" ea T 150 60\"    V P/P T-180 100\" T-180 100\" T180 100\"    H P/P T-180 100\" T-180 100\" T180 100\"    Pro/Sup T-21 60\"ea T-21 60\" ea T21 60\"    Wrist Flex/Ext T-15 60\"ea T-15 60\" ea T15 60\"  " "  Gripper T-129 100\" T-129 100\"  T129 100\"    Open/Close Jar T-24 60\" ea T-24 60\" ea T24 60\"           Flex/Scap/Abd to 90 5# 2x15 5# 2x15 5# 2x15    SL ER 5# 3x10 5# 3x10 5# 2x15    Serratus Punches 10# 3x10 10# 3x10 10# 30x           Body Blade V/H Flex/Scap/Abd 30\"ea 30\" ea 30\" each                             Goals:  by week >12   1) Range Of Motion/Joint Mobility: increase L shoulder/elbow AROM to WNL to ease capability to perform ADLs,    - goal met    2) Strength: The patient will demonstrate pain free 5/5 L UE strength to prepare for eventual RTW, goal partially met      3) The patient will be able to perform all necessary work duties pain free -partially met  "

## 2023-12-28 ENCOUNTER — TREATMENT (OUTPATIENT)
Dept: PHYSICAL THERAPY | Facility: CLINIC | Age: 53
End: 2023-12-28
Payer: COMMERCIAL

## 2023-12-28 DIAGNOSIS — M25.622 DECREASED ROM OF LEFT ELBOW: ICD-10-CM

## 2023-12-28 PROCEDURE — 97110 THERAPEUTIC EXERCISES: CPT | Mod: GP,CQ

## 2023-12-28 ASSESSMENT — PAIN SCALES - GENERAL: PAINLEVEL_OUTOF10: 0 - NO PAIN

## 2023-12-28 ASSESSMENT — PAIN - FUNCTIONAL ASSESSMENT: PAIN_FUNCTIONAL_ASSESSMENT: 0-10

## 2023-12-28 NOTE — PROGRESS NOTES
"Physical Therapy    Physical Therapy Treatment      Patient Name: Donnie Trotter  MRN: [id number]  Today's Date: 12/28/2023   Time Calculation  Start Time: 815  Stop Time: 905  Time Calculation (min): 50 min    Visit 11 on Epic (14 Total)    Assessment:   Pt. was challenged by increased time with body blade exercises. He reports he was fatigued doing all other strengthening exercises after the body blade. Minimal cueing needed to perform prone hughston exercises with the correct technique.     Plan:    Continue with working to improve strength of left elbow and upper extremity preparing for eventual RTW.         Current Problem  1. Decreased ROM of left elbow  Follow Up In Physical Therapy        Subjective  General   Pts. shoulder feels fatigued and weak at times but no longer painful. He continues to go to the gym 2-3x weekly. Pt. reports that he has been able to slowly increase his weights at the gym. Numbness, tingling and bicep discomfort continue with exercises.    Precautions  Precautions  Precautions Comment: per protocol   Post op distal bicep tendon repair protocol     Pain  Pain Assessment  Pain Assessment: 0-10  Pain Score: 0 - No pain       Objective   Added prone hughstons  Increased body blade time 30\" to 45\"  Increased shoulder AROM weight 5lb. To 7lb.    MMT L UE  Supination 4+/5  Pronation 5/5    Treatments:  EXERCISE Date 12/5/23 Date 12/13/23 Date 12/20/23 Date 12/28/23    REPS REPS REPS REPS          UE Bike              Pulleys      Flexion 3' 3' 3' 3'   Pulleys      Scaption/Abduction       Pulleys      IR              Tband       Pull down       Tband       Mid rows       Tband       IR       Tband       ER                     PRIMUS ROM  T-150 60\" ea     V P/P T-180 100\" T-180 100\"     H P/P T-180 100\" T-180 100\"     Pro/Sup T-21 60\"ea T-21 60\" ea  T21 60\" ea   Wrist Flex/Ext T-15 60\"ea T-15 60\" ea     Gripper T-129 100\" T-129 100\"  T129\" 100\"   Open/Close Jar T-24 60\" ea T-24 60\" ea  T24 " "60\"ea          Flex/Scap/Abd to 90 5# 2x15 5# 2x15 5# 2x15 7# 2x15   SL ER 5# 3x10 5# 3x10 5# 2x15 7# 2x15   Serratus Punches 10# 3x10 10# 3x10 10# 3x10 10# 3x10          Body Blade V/H Flex/Scap/Abd 30\"ea 30\" ea 30\" each 45\" each   [place] I, T,W,Y,T    Blue 2x10 each                     Goals:  by week >12   1) Range Of Motion/Joint Mobility: increase L shoulder/elbow AROM to WNL to ease capability to perform ADLs,    - goal met    2) Strength: The patient will demonstrate pain free 5/5 L UE strength to prepare for eventual RTW, goal partially met      3) The patient will be able to perform all necessary work duties pain free -partially met  "

## 2024-01-04 ENCOUNTER — TREATMENT (OUTPATIENT)
Dept: PHYSICAL THERAPY | Facility: CLINIC | Age: 54
End: 2024-01-04
Payer: COMMERCIAL

## 2024-01-04 DIAGNOSIS — M25.622 STIFFNESS OF LEFT ELBOW, NOT ELSEWHERE CLASSIFIED: Primary | ICD-10-CM

## 2024-01-04 DIAGNOSIS — M25.622 DECREASED ROM OF LEFT ELBOW: ICD-10-CM

## 2024-01-04 PROCEDURE — 97110 THERAPEUTIC EXERCISES: CPT | Mod: GP

## 2024-01-04 ASSESSMENT — PAIN - FUNCTIONAL ASSESSMENT: PAIN_FUNCTIONAL_ASSESSMENT: 0-10

## 2024-01-04 ASSESSMENT — PAIN SCALES - GENERAL: PAINLEVEL_OUTOF10: 0 - NO PAIN

## 2024-01-04 NOTE — PROGRESS NOTES
"Physical Therapy    Physical Therapy Treatment (reassessment)      Patient Name: Donnie Trotter  MRN: [id number]  Today's Date: 1/4/2024   Time Calculation  Start Time: 815  Stop Time: 830  Time Calculation (min): 15 min    Visit 12 on Epic (15 Total)    Assessment:   The patient's L UE strength & ROM continues to improve.        Plan:    Continue with UE strengthening program for eventual RTW without restrictions.         Current Problem  1. Stiffness of left elbow, not elsewhere classified        2. Decreased ROM of left elbow  Follow Up In Physical Therapy          Subjective  General   The patient continues to exercise at local fitness center several times per week.  Will follow up with Dr. Roberto Monday.    Precautions  Precautions  Precautions Comment: per protocol   Post op distal bicep tendon repair protocol     Pain  Pain Assessment  Pain Assessment: 0-10  Pain Score: 0 - No pain       Objective   MMT L UE  5/5 throughout    Quickdash = 11.36    Treatments:  EXERCISE Date 12/5/23 Date 12/13/23 Date 12/20/23 Date 12/28/23    REPS REPS REPS REPS          UE Bike              Pulleys      Flexion 3' 3' 3' 3'   Pulleys      Scaption/Abduction       Pulleys      IR              Tband       Pull down       Tband       Mid rows       Tband       IR       Tband       ER                     PRIMUS ROM  T-150 60\" ea     V P/P T-180 100\" T-180 100\"     H P/P T-180 100\" T-180 100\"     Pro/Sup T-21 60\"ea T-21 60\" ea  T21 60\" ea   Wrist Flex/Ext T-15 60\"ea T-15 60\" ea     Gripper T-129 100\" T-129 100\"  T129\" 100\"   Open/Close Jar T-24 60\" ea T-24 60\" ea  T24 60\"ea          Flex/Scap/Abd to 90 5# 2x15 5# 2x15 5# 2x15 7# 2x15   SL ER 5# 3x10 5# 3x10 5# 2x15 7# 2x15   Serratus Punches 10# 3x10 10# 3x10 10# 3x10 10# 3x10          Body Blade V/H Flex/Scap/Abd 30\"ea 30\" ea 30\" each 45\" each   [place] I, T,W,Y,T    Blue 2x10 each                    EXERCISE Date 1/4/24 Date Date Date    REPS REPS REPS REPS          UE Bike     "          Pulleys      Flexion       Pulleys      Scaption/Abduction       Pulleys      IR              Tband       Pull down       Tband       Mid rows       Tband       IR       Tband       ER                     Wand              I T Y ([facility])              UE RANGER               Reassessment 15'                                                                         Goals:  by week >12   1) Range Of Motion/Joint Mobility: increase L shoulder/elbow AROM to WNL to ease capability to perform ADLs,    - goal met    2) Strength: The patient will demonstrate pain free 5/5 L UE strength to prepare for eventual RTW - goal met      3) The patient will be able to perform all necessary work duties pain free -partially met

## 2024-01-08 ENCOUNTER — OFFICE VISIT (OUTPATIENT)
Dept: ORTHOPEDIC SURGERY | Facility: CLINIC | Age: 54
End: 2024-01-08
Payer: COMMERCIAL

## 2024-01-08 ENCOUNTER — TELEPHONE (OUTPATIENT)
Dept: ORTHOPEDIC SURGERY | Facility: CLINIC | Age: 54
End: 2024-01-08

## 2024-01-08 VITALS — WEIGHT: 245 LBS | HEIGHT: 71 IN | BODY MASS INDEX: 34.3 KG/M2

## 2024-01-08 DIAGNOSIS — S46.212D RUPTURE OF DISTAL BICEPS TENDON, LEFT, SUBSEQUENT ENCOUNTER: Primary | ICD-10-CM

## 2024-01-08 PROCEDURE — 3008F BODY MASS INDEX DOCD: CPT | Performed by: STUDENT IN AN ORGANIZED HEALTH CARE EDUCATION/TRAINING PROGRAM

## 2024-01-08 PROCEDURE — 99213 OFFICE O/P EST LOW 20 MIN: CPT | Performed by: STUDENT IN AN ORGANIZED HEALTH CARE EDUCATION/TRAINING PROGRAM

## 2024-01-08 ASSESSMENT — PAIN - FUNCTIONAL ASSESSMENT: PAIN_FUNCTIONAL_ASSESSMENT: NO/DENIES PAIN

## 2024-01-08 NOTE — PROGRESS NOTES
PRIMARY CARE PHYSICIAN: Jimenez Carlin MD    ORTHOPAEDIC POSTOPERATIVE VISIT    ASSESSMENT & PLAN    Impression: 53 y.o. male 5 months postop s/p left distal biceps reconstruction with allograft done 08/08/2023.    Plan:   Overall he is doing well.  He will progressively return to his activities as he tolerates.  He will continue to work on his strength.  He will return to see me as needed.  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  He will likely require one additional month of strengthening prior to being ready to return to work.    I reviewed their postoperative timeline and plan with them. They understand the postoperative precautions and the treatment plan going forward.     Follow-Up: Patient will follow-up as needed    At the end of the visit, all questions were answered in full. The patient is in agreement with the plan and recommendations. They will call the office with any questions/concerns.    Note dictated with [employer] software. Completed without full typed error editing and sent to avoid delay.    SUBJECTIVE  CHIEF COMPLAINT:   Chief Complaint   Patient presents with    Left Elbow - Follow-up      HPI: Donnie Trotter is a 53 y.o. patient. Donnie Trotter is now postop status post left distal biceps reconstruction with allograft.  Overall he is doing well.  He notes minimal pain.  His strength is improving.    REVIEW OF SYSTEMS  Constitutional: See HPI for pain assessment, No significant weight loss, recent trauma  Cardiovascular: No chest pain, shortness of breath  Respiratory: No difficulty breathing, cough  Gastrointestinal: No nausea, vomiting, diarrhea, constipation  Musculoskeletal: Noted in HPI, positive for pain, restricted motion, stiffness  Integumentary: No rashes, easy bruising, redness   Neurological: no numbness or tingling in extremities,  "no gait disturbances   Psychiatric: No mood changes, memory changes, social issues  Heme/Lymph: no excessive swelling, easy bruising, excessive bleeding  ENT: No hearing changes  Eyes: No vision changes    CURRENT MEDICATIONS:   Current Outpatient Medications   Medication Sig Dispense Refill    lisinopril 10 mg tablet Take 1 tablet (10 mg) by mouth once every 24 hours.      Lovaza 1 gram capsule Take 1 capsule (1 g) by mouth once every 24 hours.      rosuvastatin (Crestor) 40 mg tablet Take 1 tablet (40 mg) by mouth once every 24 hours.      Tricor 145 mg tablet Take 1 tablet (145 mg) by mouth once every 24 hours.       No current facility-administered medications for this visit.        OBJECTIVE    PHYSICAL EXAM      11/6/2023     8:05 AM   Vitals   Height (in) 1.803 m (5' 11\")   Weight (lb) 240   BMI 33.47 kg/m2   BSA (m2) 2.34 m2   Visit Report Report      Body mass index is 33.47 kg/m².    General: Well-appearing, no acute distress    Skin intact bilateral upper and lower extremities  No erythema  No warmth    Detailed examination of the left elbow demonstrates:  Surgical incision(s) well-healed  No erythema or warmth  No ecchymosis or soft tissue swelling  Biceps contour normal  Elbow range of motion: 0-0-135  Upper extremity motor grossly intact  C5-T1 sensation intact bilaterally  2+ radial pulses bilaterally  Warm and well-perfused, brisk capillary refill    IMAGING:   No new imaging      Santino Roberto MD  Attending Surgeon    Sports Medicine Orthopaedic Surgery  [facility] [place]  [facility] [employer]    "

## 2024-01-08 NOTE — LETTER
January 8, 2024     Patient: Donnie Trotter   YOB: 1970   Date of Visit: 1/8/2024       To Whom it May Concern:    Donnie Trotter was seen in my clinic on 1/8/2024. He may return to work on 2/19/2024. Any questions or concerns please call us at [phone]          Sincerely,          Santino Roberto MD

## 2024-01-10 ENCOUNTER — TELEPHONE (OUTPATIENT)
Dept: ORTHOPEDIC SURGERY | Facility: CLINIC | Age: 54
End: 2024-01-10
Payer: COMMERCIAL

## 2024-01-12 ENCOUNTER — TREATMENT (OUTPATIENT)
Dept: PHYSICAL THERAPY | Facility: CLINIC | Age: 54
End: 2024-01-12
Payer: COMMERCIAL

## 2024-01-12 DIAGNOSIS — M25.622 STIFFNESS OF LEFT ELBOW, NOT ELSEWHERE CLASSIFIED: ICD-10-CM

## 2024-01-12 DIAGNOSIS — M25.622 DECREASED ROM OF LEFT ELBOW: ICD-10-CM

## 2024-01-12 PROCEDURE — 97110 THERAPEUTIC EXERCISES: CPT | Mod: GP

## 2024-01-12 ASSESSMENT — PAIN - FUNCTIONAL ASSESSMENT: PAIN_FUNCTIONAL_ASSESSMENT: 0-10

## 2024-01-12 ASSESSMENT — PAIN SCALES - GENERAL: PAINLEVEL_OUTOF10: 0 - NO PAIN

## 2024-01-12 NOTE — PROGRESS NOTES
"Physical Therapy    Physical Therapy Treatment       Patient Name: Donnie Trotter  MRN: [id number]  Today's Date: 1/12/2024   Time Calculation  Start Time: 845  Stop Time: 925  Time Calculation (min): 40 min    Insert SmartText      Visit 13 on Epic (16 Total)    Assessment:   The patient was able to complete all exercises without complaints of L elbow discomfort.    Plan:    Continue with UE strengthening program for eventual RTW without restrictions.         Current Problem  1. Decreased ROM of left elbow  Follow Up In Physical Therapy      2. Stiffness of left elbow, not elsewhere classified  Follow Up In Physical Therapy        Subjective  General  PT  Visit  PT Received On: 01/12/24  The patient reports he is going to the gym 2x/week to perform UE strengthening exercises.    Precautions  Precautions  Precautions Comment: per protocol   Post op distal bicep tendon repair protocol     Pain  Pain Assessment  Pain Assessment: 0-10  Pain Score: 0 - No pain       Objective   MMT L UE  5/5 throughout    Treatments:  EXERCISE Date 12/5/23 Date 12/13/23 Date 12/20/23 Date 12/28/23    REPS REPS REPS REPS          UE Bike              Pulleys      Flexion 3' 3' 3' 3'   Pulleys      Scaption/Abduction       Pulleys      IR              Tband       Pull down       Tband       Mid rows       Tband       IR       Tband       ER                     PRIMUS ROM  T-150 60\" ea     V P/P T-180 100\" T-180 100\"     H P/P T-180 100\" T-180 100\"     Pro/Sup T-21 60\"ea T-21 60\" ea  T21 60\" ea   Wrist Flex/Ext T-15 60\"ea T-15 60\" ea     Gripper T-129 100\" T-129 100\"  T129\" 100\"   Open/Close Jar T-24 60\" ea T-24 60\" ea  T24 60\"ea          Flex/Scap/Abd to 90 5# 2x15 5# 2x15 5# 2x15 7# 2x15   SL ER 5# 3x10 5# 3x10 5# 2x15 7# 2x15   Serratus Punches 10# 3x10 10# 3x10 10# 3x10 10# 3x10          Body Blade V/H Flex/Scap/Abd 30\"ea 30\" ea 30\" each 45\" each   [place] I, T,W,Y,T    Blue 2x10 each                    EXERCISE Date 1/4/24 Date " "1/12/24 Date Date    REPS REPS REPS REPS          UE Bike              Pulleys      Flexion       Pulleys      Scaption/Abduction       Pulleys      IR              Tband       Pull down       Tband       Mid rows       Tband       IR       Tband       ER              Biceps Curls Hoist 3-Way  10-20# x10 ea     Wand              I T Y (paul)              UE RANGER               Reassessment 15'             PRIMUS Pro/Sup  T-21 75\"ea     Open/Close Jar  T-24 75\"ea     Flex/Ext  T-15 75\"ea     Gripper  T-129 75\"     Pro/Supination with screwdriver   T-21 75\"ea            Pro/Supination   5# x10 3# x10                Goals:  by week >12   1) Range Of Motion/Joint Mobility: increase L shoulder/elbow AROM to WNL to ease capability to perform ADLs,    - goal met    2) Strength: The patient will demonstrate pain free 5/5 L UE strength to prepare for eventual RTW - goal met      3) The patient will be able to perform all necessary work duties pain free -partially met  "

## 2024-01-17 ENCOUNTER — TREATMENT (OUTPATIENT)
Dept: PHYSICAL THERAPY | Facility: CLINIC | Age: 54
End: 2024-01-17
Payer: COMMERCIAL

## 2024-01-17 DIAGNOSIS — M25.622 STIFFNESS OF LEFT ELBOW, NOT ELSEWHERE CLASSIFIED: ICD-10-CM

## 2024-01-17 DIAGNOSIS — M25.622 DECREASED ROM OF LEFT ELBOW: ICD-10-CM

## 2024-01-17 PROCEDURE — 97110 THERAPEUTIC EXERCISES: CPT | Mod: GP,CQ

## 2024-01-17 ASSESSMENT — PAIN - FUNCTIONAL ASSESSMENT: PAIN_FUNCTIONAL_ASSESSMENT: 0-10

## 2024-01-17 ASSESSMENT — PAIN SCALES - GENERAL: PAINLEVEL_OUTOF10: 0 - NO PAIN

## 2024-01-17 NOTE — PROGRESS NOTES
"Physical Therapy    Physical Therapy Treatment       Patient Name: Donnie Trotter  MRN: [id number]  :1970  Today's Date: 2024   Time Calculation  Start Time: 0800  Stop Time: 0855  Time Calculation (min): 55 min    Visit 14 on Epic (17 Total)    Assessment:   Patient performed added exercises without complaints of increased left elbow or upper extremity symptoms. Patient continues to exhibit supination range of motion limitations.    Plan:    Continue with left upper extremity range of motion and strengthening program progressing as tolerated to prepare patient for eventual return to work.        Current Problem  1. Decreased ROM of left elbow  Follow Up In Physical Therapy      2. Stiffness of left elbow, not elsewhere classified  Follow Up In Physical Therapy        Subjective  Patient reports continued stiffness with supination range of motion movements.     Precautions  Precautions  Precautions Comment: per protocol   Post op distal bicep tendon repair protocol     Pain  Pain Assessment  Pain Assessment: 0-10  Pain Score: 0 - No pain       Objective   AROM left forearm  Supination = 55 degrees    AAROM left forearm  Supination = 70 degrees    Treatments:   EXERCISE Date 24 Date 24 Date  2024   Date    REPS REPS REPS REPS          UE Bike              Pulleys      Flexion   20x    Pulleys      Scaption/Abduction   20x    Pulleys      IR              Tband       Pull down       Tband       Mid rows       Tband       IR       Tband       ER              Biceps Curls Hoist 3-Way  10-20# x10 ea     Hoist pro/sup   25# x 30    Hoist pulling   25# x 30    Shuttle DAP wide   3B x 30    Shuttle DAP \"W\"   2B x 30    Shuttle SAP abd   1B x 30            Reassessment 15'      BTE ROM   T-168 100\"each    BTE Pro/Sup  T-21 75\"ea T-15 100\" each    BTE Open/Close Jar  T-24 75\"ea T-24 75\" each    BTE Flex/Ext  T-15 75\"ea T-21 100\"each    BTE Gripper  T-129 75\"     BTE Pro/Supination with screwdriver   " "T-21 75\"ea T-21 100\" pro/sup  T-6 100\" sup/pro    BTE key   T-6 60\" each    Pro/Supination   5# x10 3# x10                Goals:  by week >12   1) Range Of Motion/Joint Mobility: increase L shoulder/elbow AROM to WNL to ease capability to perform ADLs,    - goal met    2) Strength: The patient will demonstrate pain free 5/5 L UE strength to prepare for eventual RTW - goal met      3) The patient will be able to perform all necessary work duties pain free -  01/17/2024 progressing  "

## 2024-01-19 ENCOUNTER — TREATMENT (OUTPATIENT)
Dept: PHYSICAL THERAPY | Facility: CLINIC | Age: 54
End: 2024-01-19
Payer: COMMERCIAL

## 2024-01-19 DIAGNOSIS — M25.622 DECREASED ROM OF LEFT ELBOW: ICD-10-CM

## 2024-01-19 DIAGNOSIS — M25.622 STIFFNESS OF LEFT ELBOW, NOT ELSEWHERE CLASSIFIED: ICD-10-CM

## 2024-01-19 PROCEDURE — 97110 THERAPEUTIC EXERCISES: CPT | Mod: GP,CQ

## 2024-01-19 ASSESSMENT — PAIN - FUNCTIONAL ASSESSMENT: PAIN_FUNCTIONAL_ASSESSMENT: 0-10

## 2024-01-19 ASSESSMENT — PAIN SCALES - GENERAL: PAINLEVEL_OUTOF10: 0 - NO PAIN

## 2024-01-19 NOTE — PROGRESS NOTES
"Physical Therapy    Physical Therapy Treatment       Patient Name: Donnie Trotter  MRN: [id number]  :1970  Today's Date: 2024   Time Calculation  Start Time: 0800  Stop Time: 0848  Time Calculation (min): 48 min    Visit 15 on Epic (18 Total)    Assessment:   Patient performed exercises without complaints of increased left elbow or upper extremity symptoms. Patient continues to report tightness with active supination range of motion movements.     Plan:    Continue with left upper extremity range of motion and strengthening program progressing as tolerated to prepare patient for eventual return to work.        Current Problem  1. Decreased ROM of left elbow  Follow Up In Physical Therapy      2. Stiffness of left elbow, not elsewhere classified  Follow Up In Physical Therapy        Subjective  Patient reports he slipped on ice patch and fell yesterday and ribs are sore today. Patient indicates elbow and arm are okay.     Precautions  Precautions  Precautions Comment: per protocol   Post op distal bicep tendon repair protocol     Pain  Pain Assessment  Pain Assessment: 0-10  Pain Score: 0 - No pain       Objective   AROM left forearm  Supination = 55 degrees    AROM left elbow  WNL     Treatments:   EXERCISE Date 24 Date 24 Date  2024   Date    2024      REPS REPS REPS REPS          UE Bike              Pulleys      Flexion   20x    Pulleys      Scaption/Abduction   20x    Pulleys      IR              Tband       Pull down       Tband       Mid rows       Tband       IR       Tband       ER              Biceps Curls Hoist 3-Way  10-20# x10 ea     Hoist pro/sup   25# x 30 25# x 30   Hoist pulling   25# x 30 25# x 30   Shuttle DAP wide   3B x 30 3B x 30   Shuttle DAP \"W\"   2B x 30 2B x 30   Shuttle SAP abd   1B x 30 1B x 30   BTE H. Push/Pull    T-300 100\"     Reassessment 15'      BTE ROM   T-168 100\"each T-168 100\"   BTE Pro/Sup  T-21 75\"ea T-15 100\" each T-15 100\" each   BTE " "Open/Close Jar  T-24 75\"ea T-24 75\" each    BTE Flex/Ext  T-15 75\"ea T-21 100\"each T-21 100\"   BTE Gripper  T-129 75\"     BTE Pro/Supination with screwdriver   T-21 75\"ea T-21 100\" pro/sup  T-6 100\" sup/pro T-21 100\" pro/sup  T-6 100\" sup/pro   BTE key   T-6 60\" each T-6 60\" each   Pro/Supination   5# x10 3# x10                Goals:  by week >12   1) Range Of Motion/Joint Mobility: increase L shoulder/elbow AROM to WNL to ease capability to perform ADLs,    - goal met    2) Strength: The patient will demonstrate pain free 5/5 L UE strength to prepare for eventual RTW - goal met      3) The patient will be able to perform all necessary work duties pain free -  01/19/2024 progressing  "

## 2024-01-23 ENCOUNTER — TREATMENT (OUTPATIENT)
Dept: PHYSICAL THERAPY | Facility: CLINIC | Age: 54
End: 2024-01-23
Payer: COMMERCIAL

## 2024-01-23 DIAGNOSIS — M25.622 DECREASED ROM OF LEFT ELBOW: ICD-10-CM

## 2024-01-23 DIAGNOSIS — M25.622 STIFFNESS OF LEFT ELBOW, NOT ELSEWHERE CLASSIFIED: ICD-10-CM

## 2024-01-23 PROCEDURE — 97110 THERAPEUTIC EXERCISES: CPT | Mod: GP,CQ

## 2024-01-23 NOTE — PROGRESS NOTES
"Physical Therapy    Physical Therapy Treatment       Patient Name: Donnie Trotter  MRN: [id number]  :1970  Today's Date: 2024   Time Calculation  Start Time: 0800  Stop Time: 0850  Time Calculation (min): 50 min    Visit 16 on Epic (19 Total)    Assessment:   Patient tolerated increases in exercises without complaints of increased left elbow or upper extremity symptoms. Patient continues to experience tightness and limited range of motion with active supination range of motion movements.     Plan:    Continue with left upper extremity range of motion and strengthening program progressing as tolerated to prepare patient for eventual return to work.        Current Problem  1. Decreased ROM of left elbow  Follow Up In Physical Therapy      2. Stiffness of left elbow, not elsewhere classified  Follow Up In Physical Therapy        Subjective  Patient reports he slipped on ice patch and fell yesterday and ribs are sore today. Patient indicates elbow and arm are okay. Patient has projected RTW date of 2024.    Precautions  Precautions  Precautions Comment: per protocol   Post op distal bicep tendon repair protocol     Pain          Objective   Increased resistance to majority of exercises. See exercise log for specifics.     AROM left forearm  Supination = 55 degrees        Treatments:   EXERCISE Date   2024 Date  Date  Date          REPS REPS REPS REPS                        Pulleys Flexion 20x      Pulleys Scaption/Abduction 20x                                                       Biceps Curls Hoist 3-Way       Hoist pro/sup 25# x 30 each      Hoist pulling 25# x 30 each way      Shuttle DAP wide 3B x 30      Shuttle DAP \"W\" 3B x 30      Shuttle SAP abd 2B x 30                    BTE ROM T-171 100\" each      BTE Pro/Sup T-15 100\" each      BTE H.Push/Pull T-306 100\"      BTE Flex/Ext T-21 100\" each      BTE Shovel T-105 100\"      BTE Pro/Supination with screwdriver  T-21 100\" pro/sup  T-9 100\" " "sup/pro      BTE key T-9  60\" each                        Goals:  by week >12   1) Range Of Motion/Joint Mobility: increase L shoulder/elbow AROM to WNL to ease capability to perform ADLs,    - goal met    2) Strength: The patient will demonstrate pain free 5/5 L UE strength to prepare for eventual RTW - goal met      3) The patient will be able to perform all necessary work duties pain free -  01/23/2024 progressing  "

## 2024-01-26 ENCOUNTER — TREATMENT (OUTPATIENT)
Dept: PHYSICAL THERAPY | Facility: CLINIC | Age: 54
End: 2024-01-26
Payer: COMMERCIAL

## 2024-01-26 DIAGNOSIS — M25.622 STIFFNESS OF LEFT ELBOW, NOT ELSEWHERE CLASSIFIED: ICD-10-CM

## 2024-01-26 DIAGNOSIS — S46.211D RUPTURE OF DISTAL BICEPS TENDON, RIGHT, SUBSEQUENT ENCOUNTER: Primary | ICD-10-CM

## 2024-01-26 DIAGNOSIS — M25.622 DECREASED ROM OF LEFT ELBOW: ICD-10-CM

## 2024-01-26 PROCEDURE — 97110 THERAPEUTIC EXERCISES: CPT | Mod: GP,CQ

## 2024-01-26 ASSESSMENT — PAIN SCALES - GENERAL: PAINLEVEL_OUTOF10: 0 - NO PAIN

## 2024-01-26 ASSESSMENT — PAIN - FUNCTIONAL ASSESSMENT: PAIN_FUNCTIONAL_ASSESSMENT: 0-10

## 2024-01-26 NOTE — PROGRESS NOTES
"Physical Therapy    Physical Therapy Treatment       Patient Name: Donnie Trotter  MRN: [id number]  :1970  Today's Date: 2024   Time Calculation  Start Time: 835  Stop Time: 825  Time Calculation (min): 50 min    Visit 17 on Epic (20 Total)    Assessment:   Patient performed exercises without complaints of increased left elbow or upper extremity symptoms. Patient indicates continues to be limited with supination movements but feels is improving with reaching and lifting activities.     Plan:    Continue with left upper extremity range of motion and strengthening program progressing as tolerated to prepare patient for eventual return to work.     Patient scheduled for a reassessment on 2024.       Current Problem  1. Rupture of distal biceps tendon, right, subsequent encounter        2. Decreased ROM of left elbow  Follow Up In Physical Therapy      3. Stiffness of left elbow, not elsewhere classified  Follow Up In Physical Therapy        Subjective  Patient reports overall strength of left elbow continues to improve. Patient reports continues to experience upper extremity soreness with personal workouts.    Precautions  Precautions  Precautions Comment: per protocol   Post op distal bicep tendon repair protocol     Pain  Pain Assessment  Pain Assessment: 0-10  Pain Score: 0 - No pain       Objective   AROM left forearm  Supination = 55 degrees        Treatments:   EXERCISE Date   2024 Date    2024 Date  Date          REPS REPS REPS REPS                        Pulleys Flexion 20x      Pulleys Scaption/Abduction 20x                                                       Biceps Curls Hoist 3-Way       Hoist pro/sup 25# x 30 each 25# x 30 each     Hoist pulling 25# x 30 each way 25# x 30 each way     Shuttle DAP wide 3B x 30 3B x 30     Shuttle DAP \"W\" 3B x 30 3B x 30     Shuttle SAP abd 2B x 30 2B x 30                   BTE ROM T-171 100\" each T-175 100\"     BTE Pro/Sup T-15 100\" each " "T-21 100\" each     BTE H.Push/Pull  BTE Push/Pull T-306 100\" T-312 100\"  T-300 100\"     BTE Flex/Ext T-21 100\" each T-21 100\" each     BTE Shovel T-105 100\" T-114 100\"     BTE Pro/Supination with screwdriver  T-21 100\" pro/sup  T-9 100\" sup/pro T-21 100\" pro/sup  T-21 100\" sup/pro     BTE key T-9  60\" each T-9 75\" each                       Goals:  by week >12   1) Range Of Motion/Joint Mobility: increase L shoulder/elbow AROM to WNL to ease capability to perform ADLs,    - goal met    2) Strength: The patient will demonstrate pain free 5/5 L UE strength to prepare for eventual RTW - goal met      3) The patient will be able to perform all necessary work duties pain free -  01/26/2024 progressing  "

## 2024-01-30 ENCOUNTER — TREATMENT (OUTPATIENT)
Dept: PHYSICAL THERAPY | Facility: CLINIC | Age: 54
End: 2024-01-30
Payer: COMMERCIAL

## 2024-01-30 DIAGNOSIS — M25.622 DECREASED ROM OF LEFT ELBOW: Primary | ICD-10-CM

## 2024-01-30 DIAGNOSIS — M25.622 STIFFNESS OF LEFT ELBOW, NOT ELSEWHERE CLASSIFIED: ICD-10-CM

## 2024-01-30 PROCEDURE — 97110 THERAPEUTIC EXERCISES: CPT | Mod: GP

## 2024-01-30 ASSESSMENT — PAIN SCALES - GENERAL: PAINLEVEL_OUTOF10: 0 - NO PAIN

## 2024-01-30 ASSESSMENT — PAIN - FUNCTIONAL ASSESSMENT: PAIN_FUNCTIONAL_ASSESSMENT: 0-10

## 2024-01-30 NOTE — PROGRESS NOTES
"Physical Therapy    Physical Therapy Treatment       Patient Name: Donnie Trotter  MRN: [id number]  :1970  Today's Date: 2024   Time Calculation  Start Time: 0900  Stop Time: 0950  Time Calculation (min): 50 min    Visit 18 on Epic (21 Total)    Assessment:   The patient reports he continues to supplement PT with going to the local fitness center 2x/week.      Plan:    Continue with left upper extremity range of motion and strengthening program progressing as tolerated to prepare patient for eventual return to work.     Patient scheduled for a reassessment on 2024.       Current Problem  1. Decreased ROM of left elbow  Follow Up In Physical Therapy      2. Stiffness of left elbow, not elsewhere classified  Follow Up In Physical Therapy        Subjective  The patient reports he continues to experience \"tingling\" in L FA/elbow.      Precautions  Precautions Comment: per protocol   Post op distal bicep tendon repair protocol     Pain  Pain Assessment  Pain Assessment: 0-10  Pain Score: 0 - No pain       Objective   L FA supination MMT = 4+/5    Treatments:   EXERCISE Date   2024 Date    2024 Date 24 Date          REPS REPS REPS REPS                        Pulleys Flexion 20x      Pulleys Scaption/Abduction 20x                                                       Biceps Curls Hoist 3-Way       Hoist pro/sup 25# x 30 each 25# x 30 each 25# x30 ea    Hoist pulling 25# x 30 each way 25# x 30 each way 25# x30 ea way    Shuttle DAP wide 3B x 30 3B x 30 3B x30    Shuttle DAP \"W\" 3B x 30 3B x 30 3B x30    Shuttle SAP abd 2B x 30 2B x 30 2B x30                  BTE ROM T-171 100\" each T-175 100\" T-175 100\" ea    BTE Pro/Sup T-15 100\" each T-21 100\" each T-21 100\" ea    BTE H.Push/Pull  BTE Push/Pull T-306 100\" T-312 100\"  T-300 100\" T-312 100\"  T-300 100\"    BTE Flex/Ext T-21 100\" each T-21 100\" each T-21 100\" ea    BTE Shovel T-105 100\" T-114 100\" T-114 100\"    BTE Pro/Supination with " "screwdriver  T-21 100\" pro/sup   T-21 100\" pro/sup   T-21 100\" pro/sup    BTE key T-9  60\" each T-9 75\" each T-9 100\" ea                      Goals:  by week >12   1) Range Of Motion/Joint Mobility: increase L shoulder/elbow AROM to WNL to ease capability to perform ADLs,    - goal met    2) Strength: The patient will demonstrate pain free 5/5 L UE strength to prepare for eventual RTW - goal met      3) The patient will be able to perform all necessary work duties pain free -  progressing  "

## 2024-02-02 ENCOUNTER — TREATMENT (OUTPATIENT)
Dept: PHYSICAL THERAPY | Facility: CLINIC | Age: 54
End: 2024-02-02
Payer: COMMERCIAL

## 2024-02-02 DIAGNOSIS — M25.622 STIFFNESS OF LEFT ELBOW, NOT ELSEWHERE CLASSIFIED: ICD-10-CM

## 2024-02-02 DIAGNOSIS — M25.622 DECREASED ROM OF LEFT ELBOW: ICD-10-CM

## 2024-02-02 PROCEDURE — 97110 THERAPEUTIC EXERCISES: CPT | Mod: GP,CQ

## 2024-02-02 ASSESSMENT — PAIN - FUNCTIONAL ASSESSMENT: PAIN_FUNCTIONAL_ASSESSMENT: 0-10

## 2024-02-02 ASSESSMENT — PAIN DESCRIPTION - DESCRIPTORS: DESCRIPTORS: TIGHTNESS

## 2024-02-02 ASSESSMENT — PAIN SCALES - GENERAL: PAINLEVEL_OUTOF10: 0 - NO PAIN

## 2024-02-02 NOTE — PROGRESS NOTES
"Physical Therapy    Physical Therapy Treatment       Patient Name: Donnie Trotter  MRN: [id number]  :1970  Today's Date: 2024   Time Calculation  Start Time: 0800  Stop Time: 0850  Time Calculation (min): 50 min    Visit 19 on Epic (22 Total)    Assessment:   Patient tolerated increases in exercises without complaints of increased left elbow or upper extremity symptoms.  Patient reports primary complaint of right upper extremity fatigue with repetitive pulling and grabbing activities.     Plan:    Continue with left upper extremity range of motion and strengthening program progressing as tolerated to prepare patient for eventual return to work.     Patient scheduled for a reassessment on 2024.  Patient scheduled to RTW 2024.     Current Problem  1. Decreased ROM of left elbow  Follow Up In Physical Therapy      2. Stiffness of left elbow, not elsewhere classified  Follow Up In Physical Therapy        Subjective  Patient reports left arm feels tight this morning. Patient reports has worked out everyday this week.     Precautions  Precautions Comment: per protocol   Post op distal bicep tendon repair protocol     Pain  Pain Assessment  Pain Assessment: 0-10  Pain Score: 0 - No pain  Pain Descriptors: Tightness       Objective   Left elbow strength  Flexion = 5/5  Triceps = 5/5    Treatments:   EXERCISE Date   2024 Date    2024 Date 24 Date   2024      REPS REPS REPS REPS                        Pulleys Flexion 20x      Pulleys Scaption/Abduction 20x                                                       Biceps Curls Hoist 3-Way       Hoist pro/sup 25# x 30 each 25# x 30 each 25# x30 ea 35# x 30 each   Hoist pulling 25# x 30 each way 25# x 30 each way 25# x30 ea way 35# x 30 each   Shuttle DAP wide 3B x 30 3B x 30 3B x30 3B x 30   Shuttle DAP \"W\" 3B x 30 3B x 30 3B x30 3B x 30   Shuttle SAP abd 2B x 30 2B x 30 2B x30 2B x 30                 BTE ROM T-171 100\" each T-175 100\" " "T-175 100\" ea T-175 100\" each   BTE Pro/Sup T-15 100\" each T-21 100\" each T-21 100\" ea T-21 100\" each   BTE H.Push/Pull  BTE Push/Pull T-306 100\" T-312 100\"  T-300 100\" T-312 100\"  T-300 100\" T-318 100\"  T-318 100\"   BTE Flex/Ext T-21 100\" each T-21 100\" each T-21 100\" ea T-21 100\" each   BTE Shovel T-105 100\" T-114 100\" T-114 100\" T-120 100\"   BTE Pro/Supination with screwdriver  T-21 100\" pro/sup T-21 100\" pro/sup T-21 100\" pro/sup T-21 100\" pro/sup   BTE key T-9  60\" each T-9 75\" each T-9 100\" ea T-9 100\"                     Goals:  by week >12   1) Range Of Motion/Joint Mobility: increase L shoulder/elbow AROM to WNL to ease capability to perform ADLs,    - goal met    2) Strength: The patient will demonstrate pain free 5/5 L UE strength to prepare for eventual RTW - goal met      3) The patient will be able to perform all necessary work duties pain free -  progressing  "

## 2024-02-06 ENCOUNTER — TREATMENT (OUTPATIENT)
Dept: PHYSICAL THERAPY | Facility: CLINIC | Age: 54
End: 2024-02-06
Payer: COMMERCIAL

## 2024-02-06 DIAGNOSIS — M25.622 DECREASED ROM OF LEFT ELBOW: Primary | ICD-10-CM

## 2024-02-06 DIAGNOSIS — M25.622 STIFFNESS OF LEFT ELBOW, NOT ELSEWHERE CLASSIFIED: ICD-10-CM

## 2024-02-06 PROCEDURE — 97110 THERAPEUTIC EXERCISES: CPT | Mod: GP

## 2024-02-06 ASSESSMENT — PAIN - FUNCTIONAL ASSESSMENT: PAIN_FUNCTIONAL_ASSESSMENT: 0-10

## 2024-02-06 ASSESSMENT — PAIN SCALES - GENERAL: PAINLEVEL_OUTOF10: 0 - NO PAIN

## 2024-02-06 NOTE — PROGRESS NOTES
"Physical Therapy    Physical Therapy Treatment (discharge)    Patient Name: Donnie Trotter  MRN: [id number]  :1970  Today's Date: 2024   Time Calculation  Start Time: 730  Stop Time: 745  Time Calculation (min):  15 min    Visit 20 on Epic (23 Total)    Assessment:   The patient has achieved max therapy benefits and will discharge from therapy today.      Plan:    Discharge from therapy today.  Thank you for your referral.        Current Problem  1. Decreased ROM of left elbow        2. Stiffness of left elbow, not elsewhere classified          Subjective  Patient reports he is set to RTW on 2024.    Precautions  Precautions Comment:   Post op distal bicep tendon repair protocol     Pain  Pain Assessment  Pain Assessment: 0-10  Pain Score: 0 - No pain       Objective   Left UE strength  Biceps = 5/5  Triceps = 5/5  Supination = 4+/5  Pronation = 5/5  Wrist flexion = 5/5  Wrist extension = 5/5    QuickDASH = 15.91    Treatments:   EXERCISE Date   24 Date     Date  Date       REPS REPS REPS REPS           Discharge 15'             Pulleys Flexion       Pulleys Scaption/Abduction                                                        Biceps Curls Hoist 3-Way       Hoist pro/sup       Hoist pulling       Shuttle DAP wide       Shuttle DAP \"W\"       Shuttle SAP abd                     BTE ROM       BTE Pro/Sup       BTE H.Push/Pull  BTE Push/Pull       BTE Flex/Ext       BTE Shovel       BTE Pro/Supination with screwdriver        BTE key                         Goals:  by week >12   1) Range Of Motion/Joint Mobility: increase L shoulder/elbow AROM to WNL to ease capability to perform ADLs,    - goal met    2) Strength: The patient will demonstrate pain free 5/5 L UE strength to prepare for eventual RTW - goal partially met     3) The patient will be able to perform all necessary work duties pain free -  partially met  "

## 2025-07-21 ENCOUNTER — HOSPITAL ENCOUNTER (OUTPATIENT)
Dept: RADIOLOGY | Facility: HOSPITAL | Age: 55
Discharge: HOME | End: 2025-07-21
Payer: COMMERCIAL

## 2025-07-21 DIAGNOSIS — M25.512 PAIN IN LEFT SHOULDER: ICD-10-CM

## 2025-07-21 PROCEDURE — 73030 X-RAY EXAM OF SHOULDER: CPT | Mod: LT

## 2025-07-21 PROCEDURE — 73030 X-RAY EXAM OF SHOULDER: CPT | Mod: LEFT SIDE | Performed by: RADIOLOGY

## 2025-07-28 ENCOUNTER — APPOINTMENT (OUTPATIENT)
Dept: ORTHOPEDIC SURGERY | Facility: CLINIC | Age: 55
End: 2025-07-28
Payer: COMMERCIAL

## 2025-07-28 VITALS — WEIGHT: 240 LBS | HEIGHT: 71 IN | BODY MASS INDEX: 33.6 KG/M2

## 2025-07-28 DIAGNOSIS — S43.432A SUPERIOR LABRUM ANTERIOR-TO-POSTERIOR (SLAP) TEAR OF LEFT SHOULDER: ICD-10-CM

## 2025-07-28 PROCEDURE — 99214 OFFICE O/P EST MOD 30 MIN: CPT | Performed by: STUDENT IN AN ORGANIZED HEALTH CARE EDUCATION/TRAINING PROGRAM

## 2025-07-28 PROCEDURE — 3008F BODY MASS INDEX DOCD: CPT | Performed by: STUDENT IN AN ORGANIZED HEALTH CARE EDUCATION/TRAINING PROGRAM

## 2025-07-28 RX ORDER — VALSARTAN 160 MG/1
TABLET ORAL EVERY 24 HOURS
COMMUNITY

## 2025-07-28 ASSESSMENT — PAIN - FUNCTIONAL ASSESSMENT: PAIN_FUNCTIONAL_ASSESSMENT: 0-10

## 2025-07-28 ASSESSMENT — PAIN SCALES - GENERAL: PAINLEVEL_OUTOF10: 7

## 2025-07-28 NOTE — PROGRESS NOTES
PRIMARY CARE PHYSICIAN: Jimenez Carlin MD    ORTHOPAEDIC FOLLOW-UP: Shoulder Evaluation    ASSESSMENT & PLAN    Impression: 55 y.o. male with left shoulder pain and dysfunction concerning for SLAP tear with associated biceps tenosynovitis with underlying degenerative changes of the glenohumeral joint and rotator cuff tendinosis.    Plan:   I reviewed with the patient the nature of their diagnosis.  I reviewed their imaging studies with them.    Based on the history, physical exam and imaging studies above, the patient's presentation is consistent with the above diagnosis.  I had a long discussion with the patient regarding their presentation and the treatment options.  We discussed initial nonoperative versus operative management options as well as potential further diagnostic imaging.  I reviewed the patient's x-ray findings with him.  While he does have some underlying degenerative changes of the glenohumeral joint, he has relatively maintained joint space.  His pain seems to be mostly coming from his biceps tendon and biceps labral complex concerning for SLAP tear with associated rotator cuff tendinosis.  He has failed nonoperative management including home directed exercises/physical therapy, activity modification and anti-inflammatories.  At this point I recommend an MRI of the left shoulder to fully evaluate the soft tissue structures including his biceps, biceps labral complex and SLAP region of the labrum as well as the rotator cuff to determine for possible surgical intervention options.    The MRI is medically necessary and indicated given the patient's subjective complaints and physical exam findings as described below . The MRI will be used for potential presurgical planning purposes. The MRI should be performed in a hospital setting so the surgeon has immediate access to the images.    Follow-Up: Patient will follow-up after the MRI is completed to review the imaging studies and discuss a  treatment plan going forward    At the end of the visit, all questions were answered in full. The patient is in agreement with the plan and recommendations. They will call the office with any questions/concerns.    Note dictated with [employer] software. Completed without full typed error editing and sent to avoid delay.     SUBJECTIVE  CHIEF COMPLAINT:   Chief Complaint   Patient presents with    Left Shoulder - Pain        HPI: Donnie Trotter is a 55 y.o. male who complains of left shoulder pain, XR performed 7/21/25. Patient is nearly 2 years S/P left distal biceps reconstruction with allograft, done 8/8/2023. Donnie has had left shoulder pain radiating into neck and down upper arm for the past several years, worsening over the past week. Does not recall a specific LILIBETH. Struggling to perform repetitive lateral shoulder movements necessary for his work. Donnie states that this pain usually resolves after a trip to chiropractor, but that it did not help this time. He reports intermittent paresthesia into left hand, at night, depending on how he sleeps.     REVIEW OF SYSTEMS  Constitutional: See HPI for pain assessment, No significant weight loss, recent trauma  Cardiovascular: No chest pain, shortness of breath  Respiratory: No difficulty breathing, cough  Gastrointestinal: No nausea, vomiting, diarrhea, constipation  Musculoskeletal: Noted in HPI, positive for pain, restricted motion, stiffness  Integumentary: No rashes, easy bruising, redness   Neurological: no numbness or tingling in extremities, no gait disturbances   Psychiatric: No mood changes, memory changes, social issues  Heme/Lymph: no excessive swelling, easy bruising, excessive bleeding  ENT: No hearing changes  Eyes: No vision changes    Medical History[1]     Allergies[2]     Surgical History[3]     Family History[4]     Social History     Socioeconomic History    Marital status:      Spouse name: Not on file    Number of  "children: Not on file    Years of education: Not on file    Highest education level: Not on file   Occupational History    Not on file   Tobacco Use    Smoking status: Never    Smokeless tobacco: Former   Substance and Sexual Activity    Alcohol use: Yes     Alcohol/week: 10.0 standard drinks of alcohol     Types: 6 Cans of beer, 4 Shots of liquor per week     Comment: Usually on Saturday's    Drug use: Never    Sexual activity: Yes     Partners: Female     Birth control/protection: Male Sterilization   Other Topics Concern    Not on file   Social History Narrative    Not on file     Social Drivers of Health     Financial Resource Strain: Not on file   Food Insecurity: Not on file   Transportation Needs: Not on file   Physical Activity: Not on file   Stress: Not on file   Social Connections: Not on file   Intimate Partner Violence: Not on file   Housing Stability: Not on file        CURRENT MEDICATIONS:   Current Medications[5]     OBJECTIVE    PHYSICAL EXAM      8/21/2023     8:33 AM 9/25/2023     7:57 AM 11/6/2023     8:05 AM 1/8/2024     8:05 AM 7/26/2024     9:13 AM   Vitals   Systolic     127   Diastolic     91   Heart Rate     73   Temp     36.7 °C (98.1 °F)   Resp     18   Height 1.803 m (5' 11\") 1.803 m (5' 11\") 1.803 m (5' 11\") 1.803 m (5' 11\")    Weight (lb) 245 245 240 245    BMI 34.17 kg/m2 34.17 kg/m2 33.47 kg/m2 34.17 kg/m2    BSA (m2) 2.36 m2 2.36 m2 2.34 m2 2.36 m2    Visit Report   Report Report       There is no height or weight on file to calculate BMI.    GENERAL: A/Ox3, NAD. Appears healthy, well nourished  PSYCHIATRIC: Mood stable, appropriate memory recall  EYES: EOM intact, no scleral icterus  CARDIOVASCULAR: Palpable peripheral pulses  LUNGS: Breathing non-labored on room air  SKIN: no erythema, rashes, or ecchymosis     MUSCULOSKELETAL:  Laterality: left Shoulder Exam  - Negative Spurlings, full painless neck ROM  - Skin intact  - No erythema or warmth  - No ecchymosis or soft tissue " swelling  - Shoulder ROM: Forward flexion 160, ER 45, IR upper lumbar  - Strength:      Jobes 5-/5     ER 5-/5     Belly press and bearhug 5-/5  - Palpation: Positive tenderness biceps groove and posterolateral acromion  - Faye and Neers positive  - Speeds and O'Briens positive  - Stability: Anterior/Posterior load and shift stable   - Special Tests: positive resisted throwers    NEUROVASCULAR:  - Neurovascular Status: sensation intact to light touch distally, upper extremity motor grossly intact  - Capillary refill brisk at extremities, peripheral pulses 2+    Imaging: Multiple views of the affected left shoulder(s) demonstrate: Moderate degenerative changes of the glenohumeral joint with some osteophyte formation and mild joint space narrowing but no evidence of full-thickness bone-on-bone osteoarthritis or acute osseous abnormality.   X-rays were personally reviewed and interpreted by me.  Radiology reports were reviewed by me as well, if readily available at the time.        Santino Roberto MD  Attending Surgeon    Sports Medicine Orthopaedic Surgery  [facility] [place]  [facility]          [1]   Past Medical History:  Diagnosis Date    Hypertension    [2]   Allergies  Allergen Reactions    Keflex [Cephalexin] Unknown    Penicillins Unknown   [3] No past surgical history on file.  [4] No family history on file.  [5]   Current Outpatient Medications   Medication Sig Dispense Refill    lisinopril 10 mg tablet Take 1 tablet (10 mg) by mouth once every 24 hours.      Lovaza 1 gram capsule Take 1 capsule (1 g) by mouth once every 24 hours.      rosuvastatin (Crestor) 40 mg tablet Take 1 tablet (40 mg) by mouth once every 24 hours.      Tricor 145 mg tablet Take 1 tablet (145 mg) by mouth once every 24 hours.       No current facility-administered medications for this visit.

## 2025-08-02 ENCOUNTER — HOSPITAL ENCOUNTER (OUTPATIENT)
Dept: RADIOLOGY | Facility: CLINIC | Age: 55
Discharge: HOME | End: 2025-08-02
Payer: COMMERCIAL

## 2025-08-02 DIAGNOSIS — S43.432A SUPERIOR LABRUM ANTERIOR-TO-POSTERIOR (SLAP) TEAR OF LEFT SHOULDER: ICD-10-CM

## 2025-08-02 PROCEDURE — 73221 MRI JOINT UPR EXTREM W/O DYE: CPT | Mod: LEFT SIDE | Performed by: STUDENT IN AN ORGANIZED HEALTH CARE EDUCATION/TRAINING PROGRAM

## 2025-08-02 PROCEDURE — 73221 MRI JOINT UPR EXTREM W/O DYE: CPT | Mod: LT

## 2025-08-06 ENCOUNTER — TELEPHONE (OUTPATIENT)
Dept: ORTHOPEDIC SURGERY | Age: 55
End: 2025-08-06

## 2025-08-06 ENCOUNTER — OFFICE VISIT (OUTPATIENT)
Dept: ORTHOPEDIC SURGERY | Age: 55
End: 2025-08-06
Payer: COMMERCIAL

## 2025-08-06 VITALS — HEIGHT: 71 IN | WEIGHT: 243 LBS | BODY MASS INDEX: 34.02 KG/M2

## 2025-08-06 DIAGNOSIS — M67.814 TENDINOSIS OF LEFT ROTATOR CUFF: ICD-10-CM

## 2025-08-06 DIAGNOSIS — M75.22 BICEPS TENDINITIS, LEFT: ICD-10-CM

## 2025-08-06 DIAGNOSIS — S43.432A SUPERIOR LABRUM ANTERIOR-TO-POSTERIOR (SLAP) TEAR OF LEFT SHOULDER: Primary | ICD-10-CM

## 2025-08-06 DIAGNOSIS — M25.712 OSTEOPHYTE OF LEFT SHOULDER: ICD-10-CM

## 2025-08-06 DIAGNOSIS — M65.912 SYNOVITIS OF LEFT SHOULDER: ICD-10-CM

## 2025-08-06 PROCEDURE — 99214 OFFICE O/P EST MOD 30 MIN: CPT | Performed by: STUDENT IN AN ORGANIZED HEALTH CARE EDUCATION/TRAINING PROGRAM

## 2025-08-06 PROCEDURE — 3008F BODY MASS INDEX DOCD: CPT | Performed by: STUDENT IN AN ORGANIZED HEALTH CARE EDUCATION/TRAINING PROGRAM

## 2025-08-06 ASSESSMENT — PAIN SCALES - GENERAL: PAINLEVEL_OUTOF10: 6

## 2025-08-06 ASSESSMENT — PAIN - FUNCTIONAL ASSESSMENT: PAIN_FUNCTIONAL_ASSESSMENT: 0-10

## 2025-08-06 NOTE — TELEPHONE ENCOUNTER
Donnie Trotter is going to call our office back to provide desired timeline for surgery     Folder provided today

## 2025-08-06 NOTE — PROGRESS NOTES
PRIMARY CARE PHYSICIAN: Jimenez Carlin MD    ORTHOPAEDIC FOLLOW-UP: Shoulder Evaluation    ASSESSMENT & PLAN    Impression: 55 y.o. male with left shoulder glenohumeral osteoarthritis, inferior humeral head osteophyte, degenerative SLAP tear, biceps tenosynovitis, rotator cuff tendinosis without full-thickness tear, subacromial impingement/bursitis and glenohumeral synovitis.    Plan:   I reviewed with the patient the nature of their diagnosis.  I reviewed their imaging studies with them.    Based on the history, physical exam and imaging studies above, the patient's presentation is consistent with the above diagnosis.  I had a long discussion with the patient regarding their presentation and the treatment options.  We discussed initial nonoperative versus operative management options as well as potential further diagnostic imaging.  I reviewed the patient's MRI findings with him.  While he does have underlying degenerative changes of the glenohumeral joint, he has a large inferior humeral head osteophyte, complex degenerative SLAP tear and biceps tenosynovitis.  He is not yet ready for any sort of shoulder arthroplasty.  I believe he may get some relief from an arthroscopic procedure to address his SLAP tear, biceps tenosynovitis and remove his inferior humeral head osteophyte.  He understands that there is a risk of an incomplete resolution of his pain secondary to his underlying arthritis.  After long discussion with the patient, he elected to proceed with surgical invention form of a left shoulder arthroscopy, excision of osteophyte, extensive intra-articular debridement and synovectomy, chondroplasty, subacromial decompression and acromioplasty, open subpectoral biceps tenodesis.  I thoroughly explained the risks and benefits as well as the expected postoperative timeline for the proposed procedure versus nonoperative management. Risks of this procedure include but are not limited to bleeding,  infection, nerve injury, DVT/PE and failure of repair or implant.  The patient expressed understanding and wished to proceed with surgical intervention.  All questions were answered. We will begin the presurgical process and find them a surgical date in a timely fashion that works for them.    The patient will require an abduction sling for postoperative joint stability. Additionally, in order to decrease the amount of narcotic pain medication usage and improve their pain control and swelling, I recommend a cryotherapy machine.    At the end of the visit, all questions were answered in full. The patient is in agreement with the plan and recommendations. They will call the office with any questions/concerns.    Note dictated with [employer] software. Completed without full typed error editing and sent to avoid delay.     SUBJECTIVE  CHIEF COMPLAINT:   Chief Complaint   Patient presents with    Left Shoulder - Follow-up        HPI: Donnie Trotter returns to clinic to review results of left shoulder MRI, completed 8/2/25. He denies any new injury to the shoulder since his last visit. No new concerns.     7/28/25  Donnie Trotter is a 55 y.o. male who complains of left shoulder pain, XR performed 7/21/25. Patient is nearly 2 years S/P left distal biceps reconstruction with allograft, done 8/8/2023. Donnie has had left shoulder pain radiating into neck and down upper arm for the past several years, worsening over the past week. Does not recall a specific LILIBETH. Struggling to perform repetitive lateral shoulder movements necessary for his work. Donnie states that this pain usually resolves after a trip to chiropractor, but that it did not help this time. He reports intermittent paresthesia into left hand, at night, depending on how he sleeps.     REVIEW OF SYSTEMS  Constitutional: See HPI for pain assessment, No significant weight loss, recent trauma  Cardiovascular: No chest pain, shortness of  breath  Respiratory: No difficulty breathing, cough  Gastrointestinal: No nausea, vomiting, diarrhea, constipation  Musculoskeletal: Noted in HPI, positive for pain, restricted motion, stiffness  Integumentary: No rashes, easy bruising, redness   Neurological: no numbness or tingling in extremities, no gait disturbances   Psychiatric: No mood changes, memory changes, social issues  Heme/Lymph: no excessive swelling, easy bruising, excessive bleeding  ENT: No hearing changes  Eyes: No vision changes    Medical History[1]     Allergies[2]     Surgical History[3]     Family History[4]     Social History     Socioeconomic History    Marital status:      Spouse name: Not on file    Number of children: Not on file    Years of education: Not on file    Highest education level: Not on file   Occupational History    Not on file   Tobacco Use    Smoking status: Never    Smokeless tobacco: Former   Substance and Sexual Activity    Alcohol use: Yes     Alcohol/week: 10.0 standard drinks of alcohol     Types: 6 Cans of beer, 4 Shots of liquor per week     Comment: Usually on Saturday's    Drug use: Never    Sexual activity: Yes     Partners: Female     Birth control/protection: Male Sterilization   Other Topics Concern    Not on file   Social History Narrative    Not on file     Social Drivers of Health     Financial Resource Strain: Not on file   Food Insecurity: Not on file   Transportation Needs: Not on file   Physical Activity: Not on file   Stress: Not on file   Social Connections: Not on file   Intimate Partner Violence: Not on file   Housing Stability: Not on file        CURRENT MEDICATIONS:   Current Medications[5]     OBJECTIVE    PHYSICAL EXAM      8/21/2023     8:33 AM 9/25/2023     7:57 AM 11/6/2023     8:05 AM 1/8/2024     8:05 AM 7/26/2024     9:13 AM 7/28/2025     8:17 AM   Vitals   Systolic     127    Diastolic     91    Heart Rate     73    Temp     36.7 °C (98.1 °F)    Resp     18    Height 1.803 m (5'  "11\") 1.803 m (5' 11\") 1.803 m (5' 11\") 1.803 m (5' 11\")  1.803 m (5' 11\")   Weight (lb) 245 245 240 245  240   BMI 34.17 kg/m2 34.17 kg/m2 33.47 kg/m2 34.17 kg/m2  33.47 kg/m2   BSA (m2) 2.36 m2 2.36 m2 2.34 m2 2.36 m2  2.34 m2   Visit Report   Report Report  Report      There is no height or weight on file to calculate BMI.    GENERAL: A/Ox3, NAD. Appears healthy, well nourished  PSYCHIATRIC: Mood stable, appropriate memory recall  EYES: EOM intact, no scleral icterus  CARDIOVASCULAR: Palpable peripheral pulses  LUNGS: Breathing non-labored on room air  SKIN: no erythema, rashes, or ecchymosis     MUSCULOSKELETAL:  Laterality: left Shoulder Exam  - Negative Spurlings, full painless neck ROM  - Skin intact  - No erythema or warmth  - No ecchymosis or soft tissue swelling  - Shoulder ROM: Forward flexion 160, ER 45, IR upper lumbar  - Strength:      Jobes 5-/5     ER 5-/5     Belly press and bearhug 5-/5  - Palpation: Positive tenderness biceps groove and posterolateral acromion  - Faye and Neers positive  - Speeds and O'Briens positive  - Stability: Anterior/Posterior load and shift stable   - Special Tests: positive resisted throwers    NEUROVASCULAR:  - Neurovascular Status: sensation intact to light touch distally, upper extremity motor grossly intact  - Capillary refill brisk at extremities, peripheral pulses 2+    Imaging: MRI of the left shoulder reviewed by me demonstrates some moderate degenerative changes of the glenohumeral joint, inferior humeral head osteophyte, rotator cuff tendinosis without full-thickness tear, nondisplaced degenerative SLAP tear, biceps tenosynovitis, glenohumeral synovitis.  Images were personally reviewed and interpreted by me.  Radiology reports were reviewed by me as well, if readily available at the time.        Santino Roberto MD  Attending Surgeon    Sports Medicine Orthopaedic Surgery  [facility] [place]  Case " [facility]          [1]   Past Medical History:  Diagnosis Date    Hypertension    [2]   Allergies  Allergen Reactions    Keflex [Cephalexin] Unknown    Penicillins Unknown   [3] No past surgical history on file.  [4] No family history on file.  [5]   Current Outpatient Medications   Medication Sig Dispense Refill    lisinopril 10 mg tablet Take 1 tablet (10 mg) by mouth once every 24 hours.      Lovaza 1 gram capsule Take 1 capsule (1 g) by mouth once every 24 hours.      rosuvastatin (Crestor) 40 mg tablet Take 1 tablet (40 mg) by mouth once every 24 hours.      Tricor 145 mg tablet Take 1 tablet (145 mg) by mouth once every 24 hours.      valsartan (Diovan) 160 mg tablet once every 24 hours.       No current facility-administered medications for this visit.

## 2025-08-07 DIAGNOSIS — M25.812 IMPINGEMENT OF LEFT SHOULDER: ICD-10-CM

## 2025-08-07 DIAGNOSIS — M65.912 SYNOVITIS OF LEFT SHOULDER: Primary | ICD-10-CM

## 2025-08-07 DIAGNOSIS — M75.22 BICEPS TENDINITIS, LEFT: ICD-10-CM

## 2025-08-14 ENCOUNTER — TELEPHONE (OUTPATIENT)
Dept: ORTHOPEDIC SURGERY | Age: 55
End: 2025-08-14
Payer: COMMERCIAL

## 2025-08-16 ENCOUNTER — APPOINTMENT (OUTPATIENT)
Dept: RADIOLOGY | Facility: HOSPITAL | Age: 55
End: 2025-08-16
Payer: COMMERCIAL

## 2025-08-21 ENCOUNTER — CLINICAL SUPPORT (OUTPATIENT)
Dept: PREADMISSION TESTING | Facility: HOSPITAL | Age: 55
End: 2025-08-21
Payer: COMMERCIAL

## 2025-08-21 ASSESSMENT — ENCOUNTER SYMPTOMS
SHORTNESS OF BREATH: 0
NERVOUS/ANXIOUS: 0
DIFFICULTY URINATING: 0
EYE DISCHARGE: 0
ARTHRALGIAS: 1
COUGH: 0
NECK SWELLING: 0
DIARRHEA: 0
NECK PAIN: 0
RHINORRHEA: 0
FEVER: 0
VOMITING: 0
WOUND: 0
EYE PAIN: 0
CONFUSION: 0
ABDOMINAL PAIN: 0
NAUSEA: 0
SINUS CONGESTION: 0
CHILLS: 0
AGITATION: 0

## 2025-09-02 ENCOUNTER — PRE-ADMISSION TESTING (OUTPATIENT)
Dept: PREADMISSION TESTING | Facility: HOSPITAL | Age: 55
End: 2025-09-02
Payer: COMMERCIAL

## 2025-09-02 VITALS
RESPIRATION RATE: 14 BRPM | WEIGHT: 247.8 LBS | SYSTOLIC BLOOD PRESSURE: 127 MMHG | HEART RATE: 62 BPM | OXYGEN SATURATION: 99 % | BODY MASS INDEX: 34.69 KG/M2 | DIASTOLIC BLOOD PRESSURE: 85 MMHG | HEIGHT: 71 IN | TEMPERATURE: 97.9 F

## 2025-09-02 DIAGNOSIS — Z01.818 PREOPERATIVE TESTING: Primary | ICD-10-CM

## 2025-09-02 LAB
ALBUMIN SERPL BCP-MCNC: 4.8 G/DL (ref 3.4–5)
ALP SERPL-CCNC: 29 U/L (ref 33–120)
ALT SERPL W P-5'-P-CCNC: 28 U/L (ref 10–52)
ANION GAP SERPL CALC-SCNC: 11 MMOL/L (ref 10–20)
AST SERPL W P-5'-P-CCNC: 24 U/L (ref 9–39)
BASOPHILS # BLD AUTO: 0.02 X10*3/UL (ref 0–0.1)
BASOPHILS NFR BLD AUTO: 0.5 %
BILIRUB SERPL-MCNC: 0.5 MG/DL (ref 0–1.2)
BUN SERPL-MCNC: 14 MG/DL (ref 6–23)
CALCIUM SERPL-MCNC: 9.8 MG/DL (ref 8.6–10.3)
CHLORIDE SERPL-SCNC: 106 MMOL/L (ref 98–107)
CO2 SERPL-SCNC: 28 MMOL/L (ref 21–32)
CREAT SERPL-MCNC: 1.03 MG/DL (ref 0.5–1.3)
EGFRCR SERPLBLD CKD-EPI 2021: 87 ML/MIN/1.73M*2
EOSINOPHIL # BLD AUTO: 0.19 X10*3/UL (ref 0–0.7)
EOSINOPHIL NFR BLD AUTO: 4.4 %
ERYTHROCYTE [DISTWIDTH] IN BLOOD BY AUTOMATED COUNT: 12.5 % (ref 11.5–14.5)
GLUCOSE SERPL-MCNC: 93 MG/DL (ref 74–99)
HCT VFR BLD AUTO: 45.2 % (ref 41–52)
HGB BLD-MCNC: 14.9 G/DL (ref 13.5–17.5)
IMM GRANULOCYTES # BLD AUTO: 0.01 X10*3/UL (ref 0–0.7)
IMM GRANULOCYTES NFR BLD AUTO: 0.2 % (ref 0–0.9)
LYMPHOCYTES # BLD AUTO: 1.12 X10*3/UL (ref 1.2–4.8)
LYMPHOCYTES NFR BLD AUTO: 25.9 %
MCH RBC QN AUTO: 29 PG (ref 26–34)
MCHC RBC AUTO-ENTMCNC: 33 G/DL (ref 32–36)
MCV RBC AUTO: 88 FL (ref 80–100)
MONOCYTES # BLD AUTO: 0.34 X10*3/UL (ref 0.1–1)
MONOCYTES NFR BLD AUTO: 7.9 %
NEUTROPHILS # BLD AUTO: 2.65 X10*3/UL (ref 1.2–7.7)
NEUTROPHILS NFR BLD AUTO: 61.1 %
PLATELET # BLD AUTO: 223 X10*3/UL (ref 150–450)
POTASSIUM SERPL-SCNC: 4.6 MMOL/L (ref 3.5–5.3)
PROT SERPL-MCNC: 6.7 G/DL (ref 6.4–8.2)
RBC # BLD AUTO: 5.14 X10*6/UL (ref 4.5–5.9)
SODIUM SERPL-SCNC: 140 MMOL/L (ref 136–145)
WBC # BLD AUTO: 4.3 X10*3/UL (ref 4.4–11.3)

## 2025-09-02 PROCEDURE — 99204 OFFICE O/P NEW MOD 45 MIN: CPT | Performed by: NURSE PRACTITIONER

## 2025-09-02 PROCEDURE — 80053 COMPREHEN METABOLIC PANEL: CPT

## 2025-09-02 PROCEDURE — 85025 COMPLETE CBC W/AUTO DIFF WBC: CPT

## 2025-09-02 RX ORDER — IBUPROFEN 200 MG
200 TABLET ORAL EVERY 6 HOURS PRN
COMMUNITY

## 2025-09-02 ASSESSMENT — LIFESTYLE VARIABLES
HOW OFTEN DURING THE LAST YEAR HAVE YOU FOUND THAT YOU WERE NOT ABLE TO STOP DRINKING ONCE YOU HAD STARTED: NEVER
HOW OFTEN DO YOU HAVE A DRINK CONTAINING ALCOHOL: 2-4 TIMES A MONTH
HOW MANY STANDARD DRINKS CONTAINING ALCOHOL DO YOU HAVE ON A TYPICAL DAY: 7 TO 9
HAS A RELATIVE, FRIEND, DOCTOR, OR ANOTHER HEALTH PROFESSIONAL EXPRESSED CONCERN ABOUT YOUR DRINKING OR SUGGESTED YOU CUT DOWN: NO
AUDIT-C TOTAL SCORE: 8
AUDIT TOTAL SCORE: 8
HOW OFTEN DURING THE LAST YEAR HAVE YOU NEEDED AN ALCOHOLIC DRINK FIRST THING IN THE MORNING TO GET YOURSELF GOING AFTER A NIGHT OF HEAVY DRINKING: NEVER
HOW OFTEN DURING THE LAST YEAR HAVE YOU FAILED TO DO WHAT WAS NORMALLY EXPECTED FROM YOU BECAUSE OF DRINKING: NEVER
HOW OFTEN DURING THE LAST YEAR HAVE YOU HAD A FEELING OF GUILT OR REMORSE AFTER DRINKING: NEVER
HOW OFTEN DURING THE LAST YEAR HAVE YOU BEEN UNABLE TO REMEMBER WHAT HAPPENED THE NIGHT BEFORE BECAUSE YOU HAD BEEN DRINKING: NEVER
SKIP TO QUESTIONS 9-10: 0
HOW OFTEN DO YOU HAVE SIX OR MORE DRINKS ON ONE OCCASION: WEEKLY
HAVE YOU OR SOMEONE ELSE BEEN INJURED AS A RESULT OF YOUR DRINKING: NO

## 2025-09-02 ASSESSMENT — PAIN - FUNCTIONAL ASSESSMENT: PAIN_FUNCTIONAL_ASSESSMENT: 0-10

## 2025-10-01 ENCOUNTER — APPOINTMENT (OUTPATIENT)
Dept: ORTHOPEDIC SURGERY | Age: 55
End: 2025-10-01
Payer: COMMERCIAL